# Patient Record
Sex: FEMALE | Race: BLACK OR AFRICAN AMERICAN | NOT HISPANIC OR LATINO | Employment: OTHER | URBAN - METROPOLITAN AREA
[De-identification: names, ages, dates, MRNs, and addresses within clinical notes are randomized per-mention and may not be internally consistent; named-entity substitution may affect disease eponyms.]

---

## 2023-02-23 LAB
ALBUMIN (G/DL) IN SER/PLAS: 4.5 G/DL (ref 3.4–5)
ANION GAP IN SER/PLAS: 16 MMOL/L (ref 10–20)
CALCIUM (MG/DL) IN SER/PLAS: 8.8 MG/DL (ref 8.6–10.3)
CARBON DIOXIDE, TOTAL (MMOL/L) IN SER/PLAS: 28 MMOL/L (ref 21–32)
CHLORIDE (MMOL/L) IN SER/PLAS: 102 MMOL/L (ref 98–107)
CREATININE (MG/DL) IN SER/PLAS: 0.68 MG/DL (ref 0.5–1.05)
GFR FEMALE: >90 ML/MIN/1.73M2
GLUCOSE (MG/DL) IN SER/PLAS: 206 MG/DL (ref 74–99)
PHOSPHATE (MG/DL) IN SER/PLAS: 3.1 MG/DL (ref 2.5–4.9)
POTASSIUM (MMOL/L) IN SER/PLAS: 3.8 MMOL/L (ref 3.5–5.3)
SODIUM (MMOL/L) IN SER/PLAS: 142 MMOL/L (ref 136–145)
UREA NITROGEN (MG/DL) IN SER/PLAS: 10 MG/DL (ref 6–23)

## 2023-03-23 ENCOUNTER — HOSPITAL ENCOUNTER (OUTPATIENT)
Dept: DATA CONVERSION | Facility: HOSPITAL | Age: 68
End: 2023-03-23
Attending: INTERNAL MEDICINE | Admitting: INTERNAL MEDICINE
Payer: COMMERCIAL

## 2023-03-23 DIAGNOSIS — F17.200 NICOTINE DEPENDENCE, UNSPECIFIED, UNCOMPLICATED: ICD-10-CM

## 2023-03-23 DIAGNOSIS — Z88.0 ALLERGY STATUS TO PENICILLIN: ICD-10-CM

## 2023-03-23 DIAGNOSIS — Z98.51 TUBAL LIGATION STATUS: ICD-10-CM

## 2023-03-23 DIAGNOSIS — F31.9 BIPOLAR DISORDER, UNSPECIFIED (MULTI): ICD-10-CM

## 2023-03-23 DIAGNOSIS — E66.01 MORBID (SEVERE) OBESITY DUE TO EXCESS CALORIES (MULTI): ICD-10-CM

## 2023-03-23 DIAGNOSIS — Z88.2 ALLERGY STATUS TO SULFONAMIDES: ICD-10-CM

## 2023-03-23 DIAGNOSIS — E11.9 TYPE 2 DIABETES MELLITUS WITHOUT COMPLICATIONS (MULTI): ICD-10-CM

## 2023-03-23 DIAGNOSIS — Z79.01 LONG TERM (CURRENT) USE OF ANTICOAGULANTS: ICD-10-CM

## 2023-03-23 DIAGNOSIS — D12.8 BENIGN NEOPLASM OF RECTUM: ICD-10-CM

## 2023-03-23 DIAGNOSIS — Z86.718 PERSONAL HISTORY OF OTHER VENOUS THROMBOSIS AND EMBOLISM: ICD-10-CM

## 2023-03-23 DIAGNOSIS — K57.30 DIVERTICULOSIS OF LARGE INTESTINE WITHOUT PERFORATION OR ABSCESS WITHOUT BLEEDING: ICD-10-CM

## 2023-03-23 DIAGNOSIS — D12.6 BENIGN NEOPLASM OF COLON, UNSPECIFIED: ICD-10-CM

## 2023-03-23 DIAGNOSIS — F17.210 NICOTINE DEPENDENCE, CIGARETTES, UNCOMPLICATED: ICD-10-CM

## 2023-03-23 DIAGNOSIS — Z79.4 LONG TERM (CURRENT) USE OF INSULIN (MULTI): ICD-10-CM

## 2023-03-23 DIAGNOSIS — G47.33 OBSTRUCTIVE SLEEP APNEA (ADULT) (PEDIATRIC): ICD-10-CM

## 2023-03-23 DIAGNOSIS — D64.9 ANEMIA, UNSPECIFIED: ICD-10-CM

## 2023-03-23 DIAGNOSIS — D12.2 BENIGN NEOPLASM OF ASCENDING COLON: ICD-10-CM

## 2023-03-23 DIAGNOSIS — J44.9 CHRONIC OBSTRUCTIVE PULMONARY DISEASE, UNSPECIFIED (MULTI): ICD-10-CM

## 2023-03-23 DIAGNOSIS — I10 ESSENTIAL (PRIMARY) HYPERTENSION: ICD-10-CM

## 2023-03-23 DIAGNOSIS — D12.3 BENIGN NEOPLASM OF TRANSVERSE COLON: ICD-10-CM

## 2023-03-23 LAB
POCT GLUCOSE: 124 MG/DL (ref 74–99)
POCT GLUCOSE: 157 MG/DL (ref 74–99)

## 2023-04-03 LAB
COMPLETE PATHOLOGY REPORT: NORMAL
CONVERTED CLINICAL DIAGNOSIS-HISTORY: NORMAL
CONVERTED FINAL DIAGNOSIS: NORMAL
CONVERTED FINAL REPORT PDF LINK TO COPY AND PASTE: NORMAL
CONVERTED GROSS DESCRIPTION: NORMAL

## 2023-09-03 PROBLEM — J44.9 CHRONIC OBSTRUCTIVE LUNG DISEASE (MULTI): Status: ACTIVE | Noted: 2023-09-03

## 2023-09-03 PROBLEM — R20.9 SENSORY DISTURBANCE: Status: ACTIVE | Noted: 2023-09-03

## 2023-09-03 PROBLEM — J45.909 ASTHMA (HHS-HCC): Status: ACTIVE | Noted: 2023-09-03

## 2023-09-03 PROBLEM — G62.9 PERIPHERAL POLYNEUROPATHY: Status: ACTIVE | Noted: 2023-09-03

## 2023-09-03 PROBLEM — G47.00 INSOMNIA: Status: ACTIVE | Noted: 2023-09-03

## 2023-09-03 PROBLEM — D12.6 COLON ADENOMAS: Status: ACTIVE | Noted: 2023-09-03

## 2023-09-03 PROBLEM — M79.602 LEFT ARM PAIN: Status: ACTIVE | Noted: 2023-09-03

## 2023-09-03 PROBLEM — Z79.01 CHRONIC ANTICOAGULATION: Status: ACTIVE | Noted: 2023-09-03

## 2023-09-03 PROBLEM — H92.02 OTALGIA, LEFT: Status: ACTIVE | Noted: 2023-09-03

## 2023-09-03 PROBLEM — H02.59 FLOPPY EYELID SYNDROME: Status: ACTIVE | Noted: 2023-09-03

## 2023-09-03 PROBLEM — G89.29 CHRONIC LOWER BACK PAIN: Status: ACTIVE | Noted: 2023-09-03

## 2023-09-03 PROBLEM — H18.419: Status: ACTIVE | Noted: 2023-09-03

## 2023-09-03 PROBLEM — H52.03 HYPEROPIA OF BOTH EYES: Status: ACTIVE | Noted: 2023-09-03

## 2023-09-03 PROBLEM — E55.9 VITAMIN D DEFICIENCY: Status: ACTIVE | Noted: 2023-09-03

## 2023-09-03 PROBLEM — E04.1 THYROID NODULE: Status: ACTIVE | Noted: 2023-09-03

## 2023-09-03 PROBLEM — H04.123 BILATERAL DRY EYES: Status: ACTIVE | Noted: 2023-09-03

## 2023-09-03 PROBLEM — H02.889 MGD (MEIBOMIAN GLAND DISEASE): Status: ACTIVE | Noted: 2023-09-03

## 2023-09-03 PROBLEM — F41.9 ANXIETY: Status: ACTIVE | Noted: 2023-09-03

## 2023-09-03 PROBLEM — E11.9 TYPE 2 DIABETES MELLITUS (MULTI): Status: ACTIVE | Noted: 2023-09-03

## 2023-09-03 PROBLEM — H52.03 HYPERMETROPIA OF BOTH EYES: Status: ACTIVE | Noted: 2023-09-03

## 2023-09-03 PROBLEM — R25.2 SPASM: Status: ACTIVE | Noted: 2023-09-03

## 2023-09-03 PROBLEM — M47.816 LUMBAR SPONDYLOSIS: Status: ACTIVE | Noted: 2023-09-03

## 2023-09-03 PROBLEM — R19.5 CHANGE IN STOOL CALIBER: Status: ACTIVE | Noted: 2023-09-03

## 2023-09-03 PROBLEM — F17.200 NICOTINE ADDICTION: Status: ACTIVE | Noted: 2023-09-03

## 2023-09-03 PROBLEM — M54.50 CHRONIC LOWER BACK PAIN: Status: ACTIVE | Noted: 2023-09-03

## 2023-09-03 PROBLEM — K62.89 RECTAL PAIN: Status: ACTIVE | Noted: 2023-09-03

## 2023-09-03 PROBLEM — H93.8X2 SENSATION OF PLUGGED EAR ON LEFT SIDE: Status: ACTIVE | Noted: 2023-09-03

## 2023-09-03 PROBLEM — R32 URINARY INCONTINENCE: Status: ACTIVE | Noted: 2023-09-03

## 2023-09-03 PROBLEM — F31.9 BIPOLAR DISORDER (MULTI): Status: ACTIVE | Noted: 2023-09-03

## 2023-09-03 PROBLEM — M47.816 FACET SYNDROME, LUMBAR: Status: ACTIVE | Noted: 2023-09-03

## 2023-09-03 PROBLEM — E66.9 OBESITY: Status: ACTIVE | Noted: 2023-09-03

## 2023-09-03 PROBLEM — G47.30 SLEEP APNEA: Status: ACTIVE | Noted: 2023-09-03

## 2023-09-03 RX ORDER — OXYCODONE AND ACETAMINOPHEN 7.5; 325 MG/1; MG/1
1 TABLET ORAL EVERY 12 HOURS PRN
Status: ON HOLD | COMMUNITY
Start: 2023-02-14 | End: 2023-12-09 | Stop reason: DRUGHIGH

## 2023-09-03 RX ORDER — POLYETHYLENE GLYCOL 3350 17 G/17G
17 POWDER, FOR SOLUTION ORAL DAILY
Status: ON HOLD | COMMUNITY
Start: 2022-11-25 | End: 2023-12-09 | Stop reason: ALTCHOICE

## 2023-09-03 RX ORDER — IBUPROFEN 600 MG/1
600 TABLET ORAL DAILY PRN
COMMUNITY
Start: 2020-03-02

## 2023-09-03 RX ORDER — TRIAMCINOLONE ACETONIDE 5 MG/G
CREAM TOPICAL
COMMUNITY

## 2023-09-03 RX ORDER — OXYCODONE AND ACETAMINOPHEN 5; 325 MG/1; MG/1
TABLET ORAL
COMMUNITY
Start: 2022-12-02

## 2023-09-03 RX ORDER — HYDROCHLOROTHIAZIDE 25 MG/1
1 TABLET ORAL DAILY
COMMUNITY
Start: 2023-01-11

## 2023-09-03 RX ORDER — OXYCODONE AND ACETAMINOPHEN 10; 325 MG/1; MG/1
TABLET ORAL
COMMUNITY

## 2023-09-03 RX ORDER — TIOTROPIUM BROMIDE INHALATION SPRAY 1.56 UG/1
2 SPRAY, METERED RESPIRATORY (INHALATION) DAILY
Status: ON HOLD | COMMUNITY
End: 2023-12-09

## 2023-09-03 RX ORDER — FLUCONAZOLE 150 MG/1
150 TABLET ORAL
Status: ON HOLD | COMMUNITY
Start: 2023-02-13 | End: 2023-12-09 | Stop reason: ALTCHOICE

## 2023-09-03 RX ORDER — AMLODIPINE BESYLATE 10 MG/1
10 TABLET ORAL DAILY
COMMUNITY

## 2023-09-03 RX ORDER — BENZONATATE 200 MG/1
200 CAPSULE ORAL EVERY 8 HOURS PRN
Status: ON HOLD | COMMUNITY
Start: 2023-03-16 | End: 2023-12-09 | Stop reason: WASHOUT

## 2023-09-03 RX ORDER — PEN NEEDLE, DIABETIC 32 GX 1/4"
NEEDLE, DISPOSABLE MISCELLANEOUS DAILY
COMMUNITY
Start: 2022-12-14

## 2023-09-03 RX ORDER — ERGOCALCIFEROL 1.25 MG/1
1 CAPSULE ORAL
Status: ON HOLD | COMMUNITY
Start: 2020-03-04 | End: 2023-12-09 | Stop reason: ALTCHOICE

## 2023-09-03 RX ORDER — LIDOCAINE 50 MG/G
PATCH TOPICAL
COMMUNITY

## 2023-09-03 RX ORDER — PANTOPRAZOLE SODIUM 40 MG/1
1 TABLET, DELAYED RELEASE ORAL DAILY
Status: ON HOLD | COMMUNITY
Start: 2022-11-25 | End: 2023-12-09 | Stop reason: ALTCHOICE

## 2023-09-03 RX ORDER — LEVOFLOXACIN 500 MG/1
1 TABLET, FILM COATED ORAL DAILY
Status: ON HOLD | COMMUNITY
Start: 2022-10-08 | End: 2023-12-09 | Stop reason: ALTCHOICE

## 2023-09-03 RX ORDER — AZITHROMYCIN 500 MG/1
1 TABLET, FILM COATED ORAL DAILY
Status: ON HOLD | COMMUNITY
Start: 2023-03-16 | End: 2023-12-09 | Stop reason: ALTCHOICE

## 2023-09-03 RX ORDER — SERTRALINE HYDROCHLORIDE 50 MG/1
TABLET, FILM COATED ORAL
COMMUNITY
Start: 2017-02-06

## 2023-09-03 RX ORDER — TOPIRAMATE 50 MG/1
1 TABLET, FILM COATED ORAL DAILY
Status: ON HOLD | COMMUNITY
Start: 2020-03-02 | End: 2023-12-09 | Stop reason: ALTCHOICE

## 2023-09-03 RX ORDER — ACETAMINOPHEN 500 MG
1000 TABLET ORAL EVERY 8 HOURS PRN
Status: ON HOLD | COMMUNITY
Start: 2022-10-07 | End: 2023-12-09 | Stop reason: HOSPADM

## 2023-09-03 RX ORDER — CIPROFLOXACIN HYDROCHLORIDE AND HYDROCORTISONE 2; 10 MG/ML; MG/ML
SUSPENSION AURICULAR (OTIC)
Status: ON HOLD | COMMUNITY
Start: 2021-07-14 | End: 2023-12-09 | Stop reason: ALTCHOICE

## 2023-09-03 RX ORDER — INSULIN DETEMIR 100 [IU]/ML
8 INJECTION, SOLUTION SUBCUTANEOUS NIGHTLY
COMMUNITY
End: 2024-03-04 | Stop reason: ALTCHOICE

## 2023-09-03 RX ORDER — CYCLOBENZAPRINE HCL 10 MG
10 TABLET ORAL EVERY 8 HOURS PRN
Status: ON HOLD | COMMUNITY
End: 2023-12-09 | Stop reason: ALTCHOICE

## 2023-09-03 RX ORDER — ALBUTEROL SULFATE 0.83 MG/ML
1 SOLUTION RESPIRATORY (INHALATION) EVERY 6 HOURS PRN
COMMUNITY

## 2023-09-03 RX ORDER — HYDRALAZINE HYDROCHLORIDE 25 MG/1
25 TABLET, FILM COATED ORAL EVERY 6 HOURS
COMMUNITY
Start: 2017-03-27

## 2023-09-03 RX ORDER — METRONIDAZOLE 500 MG/1
TABLET ORAL
Status: ON HOLD | COMMUNITY
Start: 2022-09-29 | End: 2023-12-09 | Stop reason: ALTCHOICE

## 2023-09-03 RX ORDER — FLUTICASONE PROPIONATE 50 MCG
1 SPRAY, SUSPENSION (ML) NASAL 2 TIMES DAILY
Status: ON HOLD | COMMUNITY
Start: 2020-03-06 | End: 2023-12-09 | Stop reason: ALTCHOICE

## 2023-09-03 RX ORDER — BLOOD-GLUCOSE METER
EACH MISCELLANEOUS 4 TIMES DAILY
COMMUNITY
Start: 2022-12-02

## 2023-09-03 RX ORDER — BLOOD-GLUCOSE CONTROL, NORMAL
EACH MISCELLANEOUS
COMMUNITY

## 2023-09-03 RX ORDER — DIAZEPAM 5 MG/1
5 TABLET ORAL EVERY 8 HOURS PRN
COMMUNITY
Start: 2022-09-12

## 2023-09-03 RX ORDER — ERYTHROMYCIN 5 MG/G
OINTMENT OPHTHALMIC 2 TIMES DAILY
Status: ON HOLD | COMMUNITY
Start: 2022-12-22 | End: 2023-12-09 | Stop reason: ALTCHOICE

## 2023-09-03 RX ORDER — ERGOCALCIFEROL (VITAMIN D2) 50 MCG
1 CAPSULE ORAL DAILY
Status: ON HOLD | COMMUNITY
Start: 2020-08-24 | End: 2023-12-09 | Stop reason: ALTCHOICE

## 2023-09-03 RX ORDER — RIVAROXABAN 2.5 MG/1
1 TABLET, FILM COATED ORAL DAILY
COMMUNITY
Start: 2022-12-26

## 2023-09-03 RX ORDER — INSULIN LISPRO 100 [IU]/ML
INJECTION, SUSPENSION SUBCUTANEOUS
COMMUNITY
Start: 2022-12-22 | End: 2024-03-04 | Stop reason: SDUPTHER

## 2023-09-03 RX ORDER — GABAPENTIN 100 MG/1
CAPSULE ORAL
Status: ON HOLD | COMMUNITY
End: 2023-12-09 | Stop reason: ALTCHOICE

## 2023-09-03 RX ORDER — ONDANSETRON 4 MG/1
TABLET, FILM COATED ORAL
Status: ON HOLD | COMMUNITY
Start: 2022-09-29 | End: 2023-12-09 | Stop reason: ALTCHOICE

## 2023-09-03 RX ORDER — DIPHENHYDRAMINE HYDROCHLORIDE 25 MG/1
CAPSULE ORAL
COMMUNITY
Start: 2023-01-30

## 2023-09-03 RX ORDER — CARBOXYMETHYLCELLULOSE SODIUM 5 MG/ML
1 SOLUTION/ DROPS OPHTHALMIC AS NEEDED
COMMUNITY
Start: 2023-04-24

## 2023-09-03 RX ORDER — ALBUTEROL SULFATE 90 UG/1
AEROSOL, METERED RESPIRATORY (INHALATION) AS NEEDED
COMMUNITY

## 2023-10-10 ENCOUNTER — APPOINTMENT (OUTPATIENT)
Dept: GASTROENTEROLOGY | Facility: HOSPITAL | Age: 68
End: 2023-10-10
Payer: COMMERCIAL

## 2023-10-10 DIAGNOSIS — D12.6 COLON ADENOMAS: Primary | ICD-10-CM

## 2023-10-10 RX ORDER — POLYETHYLENE GLYCOL 3350 17 G/17G
238 POWDER, FOR SOLUTION ORAL ONCE
Qty: 238 G | Refills: 0 | Status: SHIPPED | OUTPATIENT
Start: 2023-10-10 | End: 2023-10-10

## 2023-10-17 ENCOUNTER — OFFICE VISIT (OUTPATIENT)
Dept: ORTHOPEDIC SURGERY | Facility: HOSPITAL | Age: 68
End: 2023-10-17
Payer: COMMERCIAL

## 2023-10-17 VITALS — WEIGHT: 243 LBS | BODY MASS INDEX: 41.48 KG/M2 | HEIGHT: 64 IN

## 2023-10-17 DIAGNOSIS — M19.012 ARTHRITIS OF SHOULDER REGION, LEFT: Primary | ICD-10-CM

## 2023-10-17 PROCEDURE — 1159F MED LIST DOCD IN RCRD: CPT | Performed by: ORTHOPAEDIC SURGERY

## 2023-10-17 PROCEDURE — 99214 OFFICE O/P EST MOD 30 MIN: CPT | Performed by: ORTHOPAEDIC SURGERY

## 2023-10-17 PROCEDURE — 3008F BODY MASS INDEX DOCD: CPT | Performed by: ORTHOPAEDIC SURGERY

## 2023-10-17 PROCEDURE — 1125F AMNT PAIN NOTED PAIN PRSNT: CPT | Performed by: ORTHOPAEDIC SURGERY

## 2023-10-17 PROCEDURE — 99204 OFFICE O/P NEW MOD 45 MIN: CPT | Performed by: ORTHOPAEDIC SURGERY

## 2023-10-17 ASSESSMENT — PAIN SCALES - GENERAL: PAINLEVEL_OUTOF10: 8

## 2023-10-17 ASSESSMENT — PAIN - FUNCTIONAL ASSESSMENT: PAIN_FUNCTIONAL_ASSESSMENT: 0-10
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
## 2023-10-26 DIAGNOSIS — D12.6 COLON ADENOMAS: Primary | ICD-10-CM

## 2023-10-26 RX ORDER — POLYETHYLENE GLYCOL-3350 AND ELECTROLYTES WITH FLAVOR PACK 240; 5.84; 2.98; 6.72; 22.72 G/278.26G; G/278.26G; G/278.26G; G/278.26G; G/278.26G
4000 POWDER, FOR SOLUTION ORAL ONCE
Qty: 4000 ML | Refills: 0 | Status: SHIPPED | OUTPATIENT
Start: 2023-10-26 | End: 2023-10-26

## 2023-11-06 ENCOUNTER — APPOINTMENT (OUTPATIENT)
Dept: ORTHOPEDIC SURGERY | Facility: CLINIC | Age: 68
End: 2023-11-06
Payer: COMMERCIAL

## 2023-11-06 ENCOUNTER — OFFICE VISIT (OUTPATIENT)
Dept: ORTHOPEDIC SURGERY | Facility: CLINIC | Age: 68
End: 2023-11-06
Payer: COMMERCIAL

## 2023-11-06 VITALS — BODY MASS INDEX: 39.52 KG/M2 | HEIGHT: 64 IN | WEIGHT: 231.48 LBS

## 2023-11-06 DIAGNOSIS — M25.512 ACUTE PAIN OF LEFT SHOULDER: Primary | ICD-10-CM

## 2023-11-06 PROCEDURE — 99203 OFFICE O/P NEW LOW 30 MIN: CPT | Performed by: ORTHOPAEDIC SURGERY

## 2023-11-06 PROCEDURE — 3008F BODY MASS INDEX DOCD: CPT | Performed by: ORTHOPAEDIC SURGERY

## 2023-11-06 PROCEDURE — 1125F AMNT PAIN NOTED PAIN PRSNT: CPT | Performed by: ORTHOPAEDIC SURGERY

## 2023-11-06 PROCEDURE — 3074F SYST BP LT 130 MM HG: CPT | Performed by: ORTHOPAEDIC SURGERY

## 2023-11-06 PROCEDURE — 1159F MED LIST DOCD IN RCRD: CPT | Performed by: ORTHOPAEDIC SURGERY

## 2023-11-06 PROCEDURE — 1160F RVW MEDS BY RX/DR IN RCRD: CPT | Performed by: ORTHOPAEDIC SURGERY

## 2023-11-06 PROCEDURE — 3078F DIAST BP <80 MM HG: CPT | Performed by: ORTHOPAEDIC SURGERY

## 2023-11-06 ASSESSMENT — PAIN SCALES - GENERAL: PAINLEVEL_OUTOF10: 5 - MODERATE PAIN

## 2023-11-06 ASSESSMENT — PAIN - FUNCTIONAL ASSESSMENT
PAIN_FUNCTIONAL_ASSESSMENT: NO/DENIES PAIN
PAIN_FUNCTIONAL_ASSESSMENT: 0-10

## 2023-11-08 NOTE — PROGRESS NOTES
Chief complaint is longstanding left shoulder pain suspect she had a rotator cuff operation in the distant past has told she has a rotator cuff tear no recent studies  She has pain at rest pain with activity she has been through physical therapy and injections.  She has pain at rest that affects her daily activity  She is on disability  Right-hand-dominant  Past medical,family and social histories have been reviewed and are up to date.  All other body systems have been reviewed and are negative for complaint.  Constitutional: Well-developed well-nourished   Eyes: Sclerae anicteric, pupils equal and round  HENT: Normocephalic atraumatic  Cardiovascular: Pulses full, regular rate and rhythm  Respiratory: Breathing not labored, no wheezing  Integumentary: Skin intact, no lesions or rashes  Neurological: Sensation intact, no gross strength deficits, reflexes equal  Psychiatric: Alert oriented and appropriate  Hematologic/lymphatic: No lymphadenopathy  Left shoulder: There is no mass or swelling she has limited mobility in all planes because of pain seems to have a good deal of weakness  There is crepitus  X-rays show some early degenerative changes may be a narrowed subacromial interval  Given her long history of treatment persistent pain failure to improve recommend further imaging with MRI to assess integrity of cuff surgical options may include reverse total shoulder arthroplasty  All questions answered

## 2023-11-15 ENCOUNTER — APPOINTMENT (OUTPATIENT)
Dept: INTEGRATIVE MEDICINE | Facility: CLINIC | Age: 68
End: 2023-11-15
Payer: COMMERCIAL

## 2023-11-29 ENCOUNTER — APPOINTMENT (OUTPATIENT)
Dept: GASTROENTEROLOGY | Facility: HOSPITAL | Age: 68
End: 2023-11-29
Payer: COMMERCIAL

## 2023-12-04 ENCOUNTER — APPOINTMENT (OUTPATIENT)
Dept: ORTHOPEDIC SURGERY | Facility: CLINIC | Age: 68
End: 2023-12-04
Payer: COMMERCIAL

## 2023-12-06 ENCOUNTER — ANCILLARY PROCEDURE (OUTPATIENT)
Dept: RADIOLOGY | Facility: CLINIC | Age: 68
End: 2023-12-06
Payer: COMMERCIAL

## 2023-12-06 DIAGNOSIS — M25.512 ACUTE PAIN OF LEFT SHOULDER: ICD-10-CM

## 2023-12-06 PROCEDURE — 73221 MRI JOINT UPR EXTREM W/O DYE: CPT | Mod: LEFT SIDE | Performed by: STUDENT IN AN ORGANIZED HEALTH CARE EDUCATION/TRAINING PROGRAM

## 2023-12-06 PROCEDURE — 73221 MRI JOINT UPR EXTREM W/O DYE: CPT | Mod: LT

## 2023-12-07 ENCOUNTER — OFFICE VISIT (OUTPATIENT)
Dept: ORTHOPEDIC SURGERY | Facility: CLINIC | Age: 68
End: 2023-12-07
Payer: COMMERCIAL

## 2023-12-07 VITALS — HEIGHT: 64 IN | BODY MASS INDEX: 39.27 KG/M2 | WEIGHT: 230 LBS

## 2023-12-07 DIAGNOSIS — S46.012D TRAUMATIC COMPLETE TEAR OF LEFT ROTATOR CUFF, SUBSEQUENT ENCOUNTER: ICD-10-CM

## 2023-12-07 DIAGNOSIS — M19.012 ARTHRITIS OF SHOULDER REGION, LEFT: Primary | ICD-10-CM

## 2023-12-07 PROCEDURE — 3008F BODY MASS INDEX DOCD: CPT | Performed by: ORTHOPAEDIC SURGERY

## 2023-12-07 PROCEDURE — 1160F RVW MEDS BY RX/DR IN RCRD: CPT | Performed by: ORTHOPAEDIC SURGERY

## 2023-12-07 PROCEDURE — 20610 DRAIN/INJ JOINT/BURSA W/O US: CPT | Performed by: ORTHOPAEDIC SURGERY

## 2023-12-07 PROCEDURE — 1159F MED LIST DOCD IN RCRD: CPT | Performed by: ORTHOPAEDIC SURGERY

## 2023-12-07 PROCEDURE — 1125F AMNT PAIN NOTED PAIN PRSNT: CPT | Performed by: ORTHOPAEDIC SURGERY

## 2023-12-07 PROCEDURE — 99213 OFFICE O/P EST LOW 20 MIN: CPT | Performed by: ORTHOPAEDIC SURGERY

## 2023-12-07 RX ORDER — LIDOCAINE HYDROCHLORIDE 20 MG/ML
2 INJECTION, SOLUTION INFILTRATION; PERINEURAL
Status: COMPLETED | OUTPATIENT
Start: 2023-12-07 | End: 2023-12-07

## 2023-12-07 RX ORDER — METHYLPREDNISOLONE ACETATE 40 MG/ML
40 INJECTION, SUSPENSION INTRA-ARTICULAR; INTRALESIONAL; INTRAMUSCULAR; SOFT TISSUE
Status: COMPLETED | OUTPATIENT
Start: 2023-12-07 | End: 2023-12-07

## 2023-12-07 RX ADMIN — LIDOCAINE HYDROCHLORIDE 2 ML: 20 INJECTION, SOLUTION INFILTRATION; PERINEURAL at 15:36

## 2023-12-07 RX ADMIN — METHYLPREDNISOLONE ACETATE 40 MG: 40 INJECTION, SUSPENSION INTRA-ARTICULAR; INTRALESIONAL; INTRAMUSCULAR; SOFT TISSUE at 15:36

## 2023-12-07 ASSESSMENT — PAIN SCALES - GENERAL: PAINLEVEL_OUTOF10: 7

## 2023-12-07 ASSESSMENT — PAIN - FUNCTIONAL ASSESSMENT: PAIN_FUNCTIONAL_ASSESSMENT: 0-10

## 2023-12-07 ASSESSMENT — PAIN DESCRIPTION - DESCRIPTORS: DESCRIPTORS: SHARP;SHOOTING;ACHING

## 2023-12-07 NOTE — PROGRESS NOTES
Follow-up left shoulder pain recent MRI shows massive cuff tear irreparable  She has pain that is severe hurts her at nighttime and with activity is progressed  Past medical,family and social histories have been reviewed and are up to date.  All other body systems have been reviewed and are negative for complaint.  Constitutional: Well-developed well-nourished   Eyes: Sclerae anicteric, pupils equal and round  HENT: Normocephalic atraumatic  Cardiovascular: Pulses full, regular rate and rhythm  Respiratory: Breathing not labored, no wheezing  Integumentary: Skin intact, no lesions or rashes  Neurological: Sensation intact, no gross strength deficits, reflexes equal  Psychiatric: Alert oriented and appropriate  Hematologic/lymphatic: No lymphadenopathy  Left shoulder: Crepitant mobility which is mildly restricted painful impingement pain  MRI and x-rays were personally reviewed and findings are as described in report  Assessment rotator cuff arthropathy massive cuff tear  Discussed treatment options she elected for shoulder injection today we discussed shoulder replacement surgery  L Inj/Asp: L subacromial bursa on 12/7/2023 3:36 PM  Indications: pain  Details: 21 G needle, lateral approach  Medications: 40 mg methylPREDNISolone acetate 40 mg/mL; 2 mL lidocaine 20 mg/mL (2 %)

## 2023-12-09 ENCOUNTER — HOSPITAL ENCOUNTER (OUTPATIENT)
Facility: HOSPITAL | Age: 68
Setting detail: OBSERVATION
Discharge: HOME | End: 2023-12-11
Attending: GENERAL PRACTICE | Admitting: INTERNAL MEDICINE
Payer: COMMERCIAL

## 2023-12-09 ENCOUNTER — APPOINTMENT (OUTPATIENT)
Dept: RADIOLOGY | Facility: HOSPITAL | Age: 68
End: 2023-12-09
Payer: COMMERCIAL

## 2023-12-09 DIAGNOSIS — A41.9 SEPSIS SECONDARY TO UTI (MULTI): Primary | ICD-10-CM

## 2023-12-09 DIAGNOSIS — N39.0 SEPSIS SECONDARY TO UTI (MULTI): Primary | ICD-10-CM

## 2023-12-09 DIAGNOSIS — N30.00 ACUTE CYSTITIS WITHOUT HEMATURIA: ICD-10-CM

## 2023-12-09 LAB
ALBUMIN SERPL BCP-MCNC: 4.3 G/DL (ref 3.4–5)
ALP SERPL-CCNC: 87 U/L (ref 33–136)
ALT SERPL W P-5'-P-CCNC: 8 U/L (ref 7–45)
ANION GAP SERPL CALC-SCNC: 14 MMOL/L (ref 10–20)
APPEARANCE UR: ABNORMAL
AST SERPL W P-5'-P-CCNC: 16 U/L (ref 9–39)
BACTERIA #/AREA URNS AUTO: ABNORMAL /HPF
BILIRUB SERPL-MCNC: 0.6 MG/DL (ref 0–1.2)
BILIRUB UR STRIP.AUTO-MCNC: NEGATIVE MG/DL
BUN SERPL-MCNC: 14 MG/DL (ref 6–23)
CALCIUM SERPL-MCNC: 9.5 MG/DL (ref 8.6–10.3)
CHLORIDE SERPL-SCNC: 100 MMOL/L (ref 98–107)
CO2 SERPL-SCNC: 28 MMOL/L (ref 21–32)
COLOR UR: YELLOW
CREAT SERPL-MCNC: 0.68 MG/DL (ref 0.5–1.05)
ERYTHROCYTE [DISTWIDTH] IN BLOOD BY AUTOMATED COUNT: 13.7 % (ref 11.5–14.5)
FLUAV RNA RESP QL NAA+PROBE: NOT DETECTED
FLUBV RNA RESP QL NAA+PROBE: NOT DETECTED
GFR SERPL CREATININE-BSD FRML MDRD: >90 ML/MIN/1.73M*2
GLUCOSE SERPL-MCNC: 123 MG/DL (ref 74–99)
GLUCOSE UR STRIP.AUTO-MCNC: NEGATIVE MG/DL
HCT VFR BLD AUTO: 41.6 % (ref 36–46)
HGB BLD-MCNC: 13.6 G/DL (ref 12–16)
HYALINE CASTS #/AREA URNS AUTO: ABNORMAL /LPF
KETONES UR STRIP.AUTO-MCNC: NEGATIVE MG/DL
LEUKOCYTE ESTERASE UR QL STRIP.AUTO: ABNORMAL
MCH RBC QN AUTO: 30.2 PG (ref 26–34)
MCHC RBC AUTO-ENTMCNC: 32.7 G/DL (ref 32–36)
MCV RBC AUTO: 92 FL (ref 80–100)
MUCOUS THREADS #/AREA URNS AUTO: ABNORMAL /LPF
NITRITE UR QL STRIP.AUTO: NEGATIVE
NRBC BLD-RTO: 0 /100 WBCS (ref 0–0)
PH UR STRIP.AUTO: 5 [PH]
PLATELET # BLD AUTO: 296 X10*3/UL (ref 150–450)
POTASSIUM SERPL-SCNC: 4.2 MMOL/L (ref 3.5–5.3)
PROT SERPL-MCNC: 7.7 G/DL (ref 6.4–8.2)
PROT UR STRIP.AUTO-MCNC: NEGATIVE MG/DL
RBC # BLD AUTO: 4.51 X10*6/UL (ref 4–5.2)
RBC # UR STRIP.AUTO: NEGATIVE /UL
RBC #/AREA URNS AUTO: ABNORMAL /HPF
RSV RNA RESP QL NAA+PROBE: NOT DETECTED
SARS-COV-2 RNA RESP QL NAA+PROBE: NOT DETECTED
SODIUM SERPL-SCNC: 138 MMOL/L (ref 136–145)
SP GR UR STRIP.AUTO: 1.01
SQUAMOUS #/AREA URNS AUTO: ABNORMAL /HPF
UROBILINOGEN UR STRIP.AUTO-MCNC: <2 MG/DL
WBC # BLD AUTO: 14.4 X10*3/UL (ref 4.4–11.3)
WBC #/AREA URNS AUTO: ABNORMAL /HPF

## 2023-12-09 PROCEDURE — 81001 URINALYSIS AUTO W/SCOPE: CPT | Performed by: GENERAL PRACTICE

## 2023-12-09 PROCEDURE — 99285 EMERGENCY DEPT VISIT HI MDM: CPT | Mod: 25 | Performed by: GENERAL PRACTICE

## 2023-12-09 PROCEDURE — 36415 COLL VENOUS BLD VENIPUNCTURE: CPT | Performed by: GENERAL PRACTICE

## 2023-12-09 PROCEDURE — 96365 THER/PROPH/DIAG IV INF INIT: CPT

## 2023-12-09 PROCEDURE — 71045 X-RAY EXAM CHEST 1 VIEW: CPT | Mod: FY

## 2023-12-09 PROCEDURE — 2500000004 HC RX 250 GENERAL PHARMACY W/ HCPCS (ALT 636 FOR OP/ED): Performed by: GENERAL PRACTICE

## 2023-12-09 PROCEDURE — 80053 COMPREHEN METABOLIC PANEL: CPT | Performed by: GENERAL PRACTICE

## 2023-12-09 PROCEDURE — G0378 HOSPITAL OBSERVATION PER HR: HCPCS

## 2023-12-09 PROCEDURE — 87086 URINE CULTURE/COLONY COUNT: CPT | Mod: AHULAB | Performed by: GENERAL PRACTICE

## 2023-12-09 PROCEDURE — 71045 X-RAY EXAM CHEST 1 VIEW: CPT | Performed by: RADIOLOGY

## 2023-12-09 PROCEDURE — 85027 COMPLETE CBC AUTOMATED: CPT | Performed by: GENERAL PRACTICE

## 2023-12-09 PROCEDURE — 94760 N-INVAS EAR/PLS OXIMETRY 1: CPT

## 2023-12-09 PROCEDURE — 87637 SARSCOV2&INF A&B&RSV AMP PRB: CPT | Performed by: GENERAL PRACTICE

## 2023-12-09 RX ORDER — ACETAMINOPHEN 325 MG/1
975 TABLET ORAL ONCE
Status: DISCONTINUED | OUTPATIENT
Start: 2023-12-09 | End: 2023-12-11 | Stop reason: HOSPADM

## 2023-12-09 RX ORDER — ACETAMINOPHEN 325 MG/1
975 TABLET ORAL ONCE
Status: COMPLETED | OUTPATIENT
Start: 2023-12-09 | End: 2023-12-09

## 2023-12-09 RX ORDER — CIPROFLOXACIN 2 MG/ML
400 INJECTION, SOLUTION INTRAVENOUS ONCE
Status: COMPLETED | OUTPATIENT
Start: 2023-12-09 | End: 2023-12-09

## 2023-12-09 RX ADMIN — CIPROFLOXACIN 400 MG: 400 INJECTION, SOLUTION INTRAVENOUS at 20:19

## 2023-12-09 RX ADMIN — SODIUM CHLORIDE 1000 ML: 9 INJECTION, SOLUTION INTRAVENOUS at 18:04

## 2023-12-09 RX ADMIN — ACETAMINOPHEN 975 MG: 325 TABLET ORAL at 18:56

## 2023-12-09 ASSESSMENT — COLUMBIA-SUICIDE SEVERITY RATING SCALE - C-SSRS
6. HAVE YOU EVER DONE ANYTHING, STARTED TO DO ANYTHING, OR PREPARED TO DO ANYTHING TO END YOUR LIFE?: NO
1. IN THE PAST MONTH, HAVE YOU WISHED YOU WERE DEAD OR WISHED YOU COULD GO TO SLEEP AND NOT WAKE UP?: NO
2. HAVE YOU ACTUALLY HAD ANY THOUGHTS OF KILLING YOURSELF?: NO

## 2023-12-09 ASSESSMENT — PAIN - FUNCTIONAL ASSESSMENT: PAIN_FUNCTIONAL_ASSESSMENT: 0-10

## 2023-12-09 ASSESSMENT — PAIN SCALES - GENERAL: PAINLEVEL_OUTOF10: 0 - NO PAIN

## 2023-12-10 LAB
ANION GAP SERPL CALC-SCNC: 11 MMOL/L (ref 10–20)
BUN SERPL-MCNC: 14 MG/DL (ref 6–23)
CALCIUM SERPL-MCNC: 8.4 MG/DL (ref 8.6–10.3)
CHLORIDE SERPL-SCNC: 101 MMOL/L (ref 98–107)
CO2 SERPL-SCNC: 26 MMOL/L (ref 21–32)
CREAT SERPL-MCNC: 0.65 MG/DL (ref 0.5–1.05)
ERYTHROCYTE [DISTWIDTH] IN BLOOD BY AUTOMATED COUNT: 13.8 % (ref 11.5–14.5)
GFR SERPL CREATININE-BSD FRML MDRD: >90 ML/MIN/1.73M*2
GLUCOSE BLD MANUAL STRIP-MCNC: 171 MG/DL (ref 74–99)
GLUCOSE BLD MANUAL STRIP-MCNC: 199 MG/DL (ref 74–99)
GLUCOSE BLD MANUAL STRIP-MCNC: 213 MG/DL (ref 74–99)
GLUCOSE BLD MANUAL STRIP-MCNC: 215 MG/DL (ref 74–99)
GLUCOSE SERPL-MCNC: 186 MG/DL (ref 74–99)
HCT VFR BLD AUTO: 34.4 % (ref 36–46)
HGB BLD-MCNC: 11.6 G/DL (ref 12–16)
LACTATE SERPL-SCNC: 0.7 MMOL/L (ref 0.4–2)
MCH RBC QN AUTO: 30.9 PG (ref 26–34)
MCHC RBC AUTO-ENTMCNC: 33.7 G/DL (ref 32–36)
MCV RBC AUTO: 92 FL (ref 80–100)
NRBC BLD-RTO: 0 /100 WBCS (ref 0–0)
PLATELET # BLD AUTO: 224 X10*3/UL (ref 150–450)
POTASSIUM SERPL-SCNC: 3.7 MMOL/L (ref 3.5–5.3)
RBC # BLD AUTO: 3.76 X10*6/UL (ref 4–5.2)
SODIUM SERPL-SCNC: 134 MMOL/L (ref 136–145)
WBC # BLD AUTO: 14.6 X10*3/UL (ref 4.4–11.3)

## 2023-12-10 PROCEDURE — 2500000004 HC RX 250 GENERAL PHARMACY W/ HCPCS (ALT 636 FOR OP/ED): Performed by: NURSE PRACTITIONER

## 2023-12-10 PROCEDURE — 2500000002 HC RX 250 W HCPCS SELF ADMINISTERED DRUGS (ALT 637 FOR MEDICARE OP, ALT 636 FOR OP/ED): Performed by: HOSPITALIST

## 2023-12-10 PROCEDURE — 99222 1ST HOSP IP/OBS MODERATE 55: CPT | Performed by: NURSE PRACTITIONER

## 2023-12-10 PROCEDURE — 83605 ASSAY OF LACTIC ACID: CPT | Performed by: NURSE PRACTITIONER

## 2023-12-10 PROCEDURE — 85027 COMPLETE CBC AUTOMATED: CPT | Performed by: HOSPITALIST

## 2023-12-10 PROCEDURE — 94760 N-INVAS EAR/PLS OXIMETRY 1: CPT

## 2023-12-10 PROCEDURE — 96366 THER/PROPH/DIAG IV INF ADDON: CPT

## 2023-12-10 PROCEDURE — 2500000004 HC RX 250 GENERAL PHARMACY W/ HCPCS (ALT 636 FOR OP/ED): Performed by: HOSPITALIST

## 2023-12-10 PROCEDURE — 36415 COLL VENOUS BLD VENIPUNCTURE: CPT | Performed by: NURSE PRACTITIONER

## 2023-12-10 PROCEDURE — G0378 HOSPITAL OBSERVATION PER HR: HCPCS

## 2023-12-10 PROCEDURE — 2500000001 HC RX 250 WO HCPCS SELF ADMINISTERED DRUGS (ALT 637 FOR MEDICARE OP): Performed by: HOSPITALIST

## 2023-12-10 PROCEDURE — 82947 ASSAY GLUCOSE BLOOD QUANT: CPT | Mod: 91

## 2023-12-10 PROCEDURE — 80048 BASIC METABOLIC PNL TOTAL CA: CPT | Performed by: HOSPITALIST

## 2023-12-10 PROCEDURE — 36415 COLL VENOUS BLD VENIPUNCTURE: CPT | Performed by: HOSPITALIST

## 2023-12-10 PROCEDURE — 2500000001 HC RX 250 WO HCPCS SELF ADMINISTERED DRUGS (ALT 637 FOR MEDICARE OP): Performed by: PHYSICIAN ASSISTANT

## 2023-12-10 RX ORDER — CIPROFLOXACIN 2 MG/ML
400 INJECTION, SOLUTION INTRAVENOUS EVERY 12 HOURS
Status: DISCONTINUED | OUTPATIENT
Start: 2023-12-10 | End: 2023-12-11 | Stop reason: HOSPADM

## 2023-12-10 RX ORDER — SODIUM CHLORIDE 9 MG/ML
100 INJECTION, SOLUTION INTRAVENOUS CONTINUOUS
Status: DISCONTINUED | OUTPATIENT
Start: 2023-12-10 | End: 2023-12-11 | Stop reason: HOSPADM

## 2023-12-10 RX ORDER — DIPHENHYDRAMINE HCL 25 MG
25 CAPSULE ORAL EVERY 6 HOURS PRN
Status: DISCONTINUED | OUTPATIENT
Start: 2023-12-10 | End: 2023-12-11 | Stop reason: HOSPADM

## 2023-12-10 RX ORDER — SODIUM CHLORIDE 9 MG/ML
75 INJECTION, SOLUTION INTRAVENOUS CONTINUOUS
Status: DISCONTINUED | OUTPATIENT
Start: 2023-12-10 | End: 2023-12-11 | Stop reason: HOSPADM

## 2023-12-10 RX ORDER — AMLODIPINE BESYLATE 10 MG/1
10 TABLET ORAL DAILY
Status: DISCONTINUED | OUTPATIENT
Start: 2023-12-10 | End: 2023-12-11 | Stop reason: HOSPADM

## 2023-12-10 RX ORDER — ALBUTEROL SULFATE 90 UG/1
1 AEROSOL, METERED RESPIRATORY (INHALATION) EVERY 4 HOURS PRN
Status: DISCONTINUED | OUTPATIENT
Start: 2023-12-10 | End: 2023-12-10

## 2023-12-10 RX ORDER — ACETAMINOPHEN 325 MG/1
650 TABLET ORAL EVERY 4 HOURS PRN
Status: DISCONTINUED | OUTPATIENT
Start: 2023-12-10 | End: 2023-12-11 | Stop reason: HOSPADM

## 2023-12-10 RX ORDER — ENOXAPARIN SODIUM 100 MG/ML
40 INJECTION SUBCUTANEOUS EVERY 24 HOURS
Status: DISCONTINUED | OUTPATIENT
Start: 2023-12-10 | End: 2023-12-11 | Stop reason: HOSPADM

## 2023-12-10 RX ORDER — ALBUTEROL SULFATE 0.83 MG/ML
2.5 SOLUTION RESPIRATORY (INHALATION) EVERY 4 HOURS PRN
Status: DISCONTINUED | OUTPATIENT
Start: 2023-12-10 | End: 2023-12-11 | Stop reason: HOSPADM

## 2023-12-10 RX ORDER — ONDANSETRON 4 MG/1
4 TABLET, ORALLY DISINTEGRATING ORAL EVERY 8 HOURS PRN
Status: DISCONTINUED | OUTPATIENT
Start: 2023-12-10 | End: 2023-12-11 | Stop reason: HOSPADM

## 2023-12-10 RX ORDER — INSULIN LISPRO 100 [IU]/ML
0-10 INJECTION, SOLUTION INTRAVENOUS; SUBCUTANEOUS
Status: DISCONTINUED | OUTPATIENT
Start: 2023-12-10 | End: 2023-12-11 | Stop reason: HOSPADM

## 2023-12-10 RX ORDER — INSULIN GLARGINE 100 [IU]/ML
8 INJECTION, SOLUTION SUBCUTANEOUS NIGHTLY
Status: DISCONTINUED | OUTPATIENT
Start: 2023-12-10 | End: 2023-12-11 | Stop reason: HOSPADM

## 2023-12-10 RX ORDER — DEXTROSE 50 % IN WATER (D50W) INTRAVENOUS SYRINGE
25
Status: DISCONTINUED | OUTPATIENT
Start: 2023-12-10 | End: 2023-12-11 | Stop reason: HOSPADM

## 2023-12-10 RX ORDER — CIPROFLOXACIN 2 MG/ML
400 INJECTION, SOLUTION INTRAVENOUS EVERY 12 HOURS
Status: DISCONTINUED | OUTPATIENT
Start: 2023-12-10 | End: 2023-12-10 | Stop reason: SDUPTHER

## 2023-12-10 RX ORDER — DEXTROSE MONOHYDRATE 100 MG/ML
0.3 INJECTION, SOLUTION INTRAVENOUS ONCE AS NEEDED
Status: DISCONTINUED | OUTPATIENT
Start: 2023-12-10 | End: 2023-12-11 | Stop reason: HOSPADM

## 2023-12-10 RX ORDER — ONDANSETRON HYDROCHLORIDE 2 MG/ML
4 INJECTION, SOLUTION INTRAVENOUS EVERY 8 HOURS PRN
Status: DISCONTINUED | OUTPATIENT
Start: 2023-12-10 | End: 2023-12-11 | Stop reason: HOSPADM

## 2023-12-10 RX ORDER — SENNOSIDES 8.6 MG/1
2 TABLET ORAL 2 TIMES DAILY
Status: DISCONTINUED | OUTPATIENT
Start: 2023-12-10 | End: 2023-12-11 | Stop reason: HOSPADM

## 2023-12-10 RX ADMIN — SODIUM CHLORIDE 100 ML/HR: 9 INJECTION, SOLUTION INTRAVENOUS at 01:04

## 2023-12-10 RX ADMIN — DIPHENHYDRAMINE HYDROCHLORIDE 25 MG: 25 CAPSULE ORAL at 21:26

## 2023-12-10 RX ADMIN — INSULIN LISPRO 2 UNITS: 100 INJECTION, SOLUTION INTRAVENOUS; SUBCUTANEOUS at 14:57

## 2023-12-10 RX ADMIN — SODIUM CHLORIDE 75 ML/HR: 9 INJECTION, SOLUTION INTRAVENOUS at 15:01

## 2023-12-10 RX ADMIN — CIPROFLOXACIN 400 MG: 400 INJECTION, SOLUTION INTRAVENOUS at 09:17

## 2023-12-10 RX ADMIN — INSULIN LISPRO 4 UNITS: 100 INJECTION, SOLUTION INTRAVENOUS; SUBCUTANEOUS at 16:44

## 2023-12-10 RX ADMIN — SENNOSIDES 17.2 MG: 8.6 TABLET, FILM COATED ORAL at 01:03

## 2023-12-10 RX ADMIN — CIPROFLOXACIN 400 MG: 400 INJECTION, SOLUTION INTRAVENOUS at 21:19

## 2023-12-10 SDOH — ECONOMIC STABILITY: FOOD INSECURITY: WITHIN THE PAST 12 MONTHS, THE FOOD YOU BOUGHT JUST DIDN'T LAST AND YOU DIDN'T HAVE MONEY TO GET MORE.: OFTEN TRUE

## 2023-12-10 SDOH — SOCIAL STABILITY: SOCIAL INSECURITY: DO YOU FEEL UNSAFE GOING BACK TO THE PLACE WHERE YOU ARE LIVING?: NO

## 2023-12-10 SDOH — SOCIAL STABILITY: SOCIAL INSECURITY: WERE YOU ABLE TO COMPLETE ALL THE BEHAVIORAL HEALTH SCREENINGS?: YES

## 2023-12-10 SDOH — ECONOMIC STABILITY: FOOD INSECURITY: WITHIN THE PAST 12 MONTHS, YOU WORRIED THAT YOUR FOOD WOULD RUN OUT BEFORE YOU GOT MONEY TO BUY MORE.: OFTEN TRUE

## 2023-12-10 SDOH — SOCIAL STABILITY: SOCIAL INSECURITY: HAS ANYONE EVER THREATENED TO HURT YOUR FAMILY OR YOUR PETS?: NO

## 2023-12-10 SDOH — SOCIAL STABILITY: SOCIAL INSECURITY: DOES ANYONE TRY TO KEEP YOU FROM HAVING/CONTACTING OTHER FRIENDS OR DOING THINGS OUTSIDE YOUR HOME?: NO

## 2023-12-10 SDOH — SOCIAL STABILITY: SOCIAL INSECURITY: ABUSE: ADULT

## 2023-12-10 SDOH — SOCIAL STABILITY: SOCIAL INSECURITY: ARE YOU OR HAVE YOU BEEN THREATENED OR ABUSED PHYSICALLY, EMOTIONALLY, OR SEXUALLY BY ANYONE?: NO

## 2023-12-10 SDOH — SOCIAL STABILITY: SOCIAL INSECURITY: HAVE YOU HAD THOUGHTS OF HARMING ANYONE ELSE?: NO

## 2023-12-10 SDOH — SOCIAL STABILITY: SOCIAL INSECURITY: ARE THERE ANY APPARENT SIGNS OF INJURIES/BEHAVIORS THAT COULD BE RELATED TO ABUSE/NEGLECT?: NO

## 2023-12-10 SDOH — SOCIAL STABILITY: SOCIAL INSECURITY: DO YOU FEEL ANYONE HAS EXPLOITED OR TAKEN ADVANTAGE OF YOU FINANCIALLY OR OF YOUR PERSONAL PROPERTY?: NO

## 2023-12-10 ASSESSMENT — PAIN - FUNCTIONAL ASSESSMENT
PAIN_FUNCTIONAL_ASSESSMENT: 0-10

## 2023-12-10 ASSESSMENT — COGNITIVE AND FUNCTIONAL STATUS - GENERAL
DAILY ACTIVITIY SCORE: 24
DAILY ACTIVITIY SCORE: 24
PATIENT BASELINE BEDBOUND: NO
MOBILITY SCORE: 24
MOBILITY SCORE: 24

## 2023-12-10 ASSESSMENT — ACTIVITIES OF DAILY LIVING (ADL)
GROOMING: INDEPENDENT
ADEQUATE_TO_COMPLETE_ADL: YES
WALKS IN HOME: INDEPENDENT
TOILETING: INDEPENDENT
BATHING: INDEPENDENT
HEARING - RIGHT EAR: FUNCTIONAL
HEARING - LEFT EAR: FUNCTIONAL
JUDGMENT_ADEQUATE_SAFELY_COMPLETE_DAILY_ACTIVITIES: YES
FEEDING YOURSELF: INDEPENDENT
LACK_OF_TRANSPORTATION: YES
PATIENT'S MEMORY ADEQUATE TO SAFELY COMPLETE DAILY ACTIVITIES?: YES
DRESSING YOURSELF: INDEPENDENT

## 2023-12-10 ASSESSMENT — PATIENT HEALTH QUESTIONNAIRE - PHQ9
1. LITTLE INTEREST OR PLEASURE IN DOING THINGS: NOT AT ALL
SUM OF ALL RESPONSES TO PHQ9 QUESTIONS 1 & 2: 0
2. FEELING DOWN, DEPRESSED OR HOPELESS: NOT AT ALL

## 2023-12-10 ASSESSMENT — ENCOUNTER SYMPTOMS
CHILLS: 1
TREMORS: 1

## 2023-12-10 ASSESSMENT — LIFESTYLE VARIABLES
SKIP TO QUESTIONS 9-10: 1
AUDIT-C TOTAL SCORE: 1
AUDIT-C TOTAL SCORE: 1
HOW OFTEN DO YOU HAVE A DRINK CONTAINING ALCOHOL: MONTHLY OR LESS
HOW OFTEN DO YOU HAVE 6 OR MORE DRINKS ON ONE OCCASION: NEVER
HOW MANY STANDARD DRINKS CONTAINING ALCOHOL DO YOU HAVE ON A TYPICAL DAY: PATIENT DOES NOT DRINK

## 2023-12-10 ASSESSMENT — PAIN SCALES - GENERAL
PAINLEVEL_OUTOF10: 5 - MODERATE PAIN
PAINLEVEL_OUTOF10: 7

## 2023-12-10 NOTE — PROGRESS NOTES
SW requested to see Pt regarding food access. Met with Pt. Pt receives food assistance and Social Security. Pt reports going to food pantries however states the food at the pantries are not healthy, they are high in sodium etc. Discussed food pantries that give out fresh fruits and vegetables. Pt given the number for [employer] to find locations that hand out healthy foods. Pt states in the summer she goes to the library and they have healthy food choices. Discussed Pt utilizing [employer] transportation to take her to food pantries. Pt reports she normally gets ride to the food pantries.

## 2023-12-10 NOTE — CARE PLAN
The patient's goals for the shift include no adverse reactions from antibiotics states multiple allergies    The clinical goals for the shift include obtain antibiotics    Over the shift, the patient continues to make progress toward the following goals.

## 2023-12-10 NOTE — HOSPITAL COURSE
You came to the hospital for chills fever. Found to have a UTI. We started IV antibiotics and kept in the hospital until you were fever free. You are being discharged on oral antibiotics.

## 2023-12-10 NOTE — H&P
History Of Present Illness  Nahomi Saavedra is a 68 y.o. female presenting with chills and rigor.  She reports that the symptoms started yesterday.  She reports that her daughter had just left her house, and within 30 minutes she started experiencing these chills and full body rigors.  She reports that the tremors were so bad that she was unable to dial her phone and had to ask her phone to dial for her.  She denies any urinary symptoms or cough.  She reports that she did check her temperature yesterday and did not have a fever.  However she did develop a fever of 102.7 overnight.     Past Medical History  Past Medical History:   Diagnosis Date    Anxiety disorder, unspecified 03/02/2020    Anxiety    Arcus senilis, unspecified eye 11/29/2016    Senile corneal change    Bipolar disorder, unspecified (CMS/[facility]) 11/29/2016    Bipolar disorder    Chronic obstructive pulmonary disease, unspecified (CMS/[facility]) 11/29/2016    Chronic obstructive lung disease    Cramp and spasm 01/04/2017    Spasm    Insomnia, unspecified 01/04/2017    Insomnia    Low back pain, unspecified 03/02/2020    Chronic lower back pain    Obesity, unspecified 11/29/2016    Obesity    Polyneuropathy, unspecified 01/04/2017    Peripheral polyneuropathy    Spondylosis without myelopathy or radiculopathy, lumbar region 02/28/2017    Lumbar spondylosis    Spondylosis without myelopathy or radiculopathy, lumbar region 01/04/2017    Facet syndrome, lumbar    Type 2 diabetes mellitus without complications (CMS/[facility]) 03/02/2020    Type 2 diabetes mellitus    Unspecified disturbances of skin sensation 02/28/2017    Sensory disturbance    Unspecified urinary incontinence 01/04/2017    Urinary incontinence    Zoster without complications 11/29/2016    Herpes zoster       Surgical History  Past Surgical History:   Procedure Laterality Date    OTHER SURGICAL HISTORY  06/30/2021    Shoulder arthroscopy    US GUIDED THYROID BIOPSY  3/7/2022    US GUIDED THYROID BIOPSY  "3/7/2022        Social History  She reports that she has been smoking cigarettes. She has a 14.00 pack-year smoking history. She has never used smokeless tobacco. She reports current alcohol use. She reports that she does not currently use drugs.    Family History  Family History   Problem Relation Name Age of Onset    Depression Mother      Diabetes Mother      Other (essential hypertension) Mother      Cancer Mother          Allergies  Peg 3350-sod sulf,chlr-pot-mag; Ace inhibitors; Albiglutide; Cromolyn; Hydrocodone-homatropine; Insulin aspart; Insulin nph human isophane; Oxybutynin; Penicillins; Sulfa (sulfonamide antibiotics); and Ziprasidone    Review of Systems   Constitutional:  Positive for chills.   Neurological:  Positive for tremors.   All other systems reviewed and are negative.       Physical Exam  Constitutional:       Appearance: Normal appearance.   Cardiovascular:      Rate and Rhythm: Normal rate and regular rhythm.      Pulses: Normal pulses.      Heart sounds: Normal heart sounds. No murmur heard.     No gallop.   Pulmonary:      Effort: Pulmonary effort is normal. No respiratory distress.      Breath sounds: Normal breath sounds. No wheezing or rhonchi.   Abdominal:      General: Abdomen is flat. There is no distension.      Palpations: Abdomen is soft.      Tenderness: There is no abdominal tenderness. There is no guarding.   Musculoskeletal:         General: Normal range of motion.   Skin:     General: Skin is warm.      Capillary Refill: Capillary refill takes less than 2 seconds.   Neurological:      Mental Status: She is alert and oriented to person, place, and time.   Psychiatric:         Mood and Affect: Mood normal.         Thought Content: Thought content normal.         Judgment: Judgment normal.          Last Recorded Vitals  Blood pressure 157/72, pulse 68, temperature 36.4 °C (97.5 °F), temperature source Temporal, resp. rate 18, height 1.626 m (5' 4.02\"), weight 104 kg (228 lb 6.3 " oz), SpO2 96 %.    Relevant Results  Scheduled medications  acetaminophen, 975 mg, oral, Once  amLODIPine, 10 mg, oral, Daily  ciprofloxacin, 400 mg, intravenous, q12h  enoxaparin, 40 mg, subcutaneous, q24h  insulin glargine, 8 Units, subcutaneous, Nightly  insulin lispro, 0-10 Units, subcutaneous, TID with meals  insulin lispro protamin-lispro, 16 Units, subcutaneous, Daily before evening meal  [START ON 12/11/2023] insulin lispro protamin-lispro, 26 Units, subcutaneous, Daily before breakfast  sennosides, 2 tablet, oral, BID      Continuous medications  sodium chloride 0.9%, 100 mL/hr, Last Rate: 100 mL/hr (12/10/23 0543)      PRN medications  PRN medications: acetaminophen, acetaminophen, albuterol, dextrose 10 % in water (D10W), dextrose, glucagon, ondansetron ODT **OR** ondansetron    Results for orders placed or performed during the hospital encounter of 12/09/23 (from the past 24 hour(s))   CBC   Result Value Ref Range    WBC 14.4 (H) 4.4 - 11.3 x10*3/uL    nRBC 0.0 0.0 - 0.0 /100 WBCs    RBC 4.51 4.00 - 5.20 x10*6/uL    Hemoglobin 13.6 12.0 - 16.0 g/dL    Hematocrit 41.6 36.0 - 46.0 %    MCV 92 80 - 100 fL    MCH 30.2 26.0 - 34.0 pg    MCHC 32.7 32.0 - 36.0 g/dL    RDW 13.7 11.5 - 14.5 %    Platelets 296 150 - 450 x10*3/uL   Comprehensive metabolic panel   Result Value Ref Range    Glucose 123 (H) 74 - 99 mg/dL    Sodium 138 136 - 145 mmol/L    Potassium 4.2 3.5 - 5.3 mmol/L    Chloride 100 98 - 107 mmol/L    Bicarbonate 28 21 - 32 mmol/L    Anion Gap 14 10 - 20 mmol/L    Urea Nitrogen 14 6 - 23 mg/dL    Creatinine 0.68 0.50 - 1.05 mg/dL    eGFR >90 >60 mL/min/1.73m*2    Calcium 9.5 8.6 - 10.3 mg/dL    Albumin 4.3 3.4 - 5.0 g/dL    Alkaline Phosphatase 87 33 - 136 U/L    Total Protein 7.7 6.4 - 8.2 g/dL    AST 16 9 - 39 U/L    Bilirubin, Total 0.6 0.0 - 1.2 mg/dL    ALT 8 7 - 45 U/L   Sars-CoV-2 and Influenza A/B PCR   Result Value Ref Range    Flu A Result Not Detected Not Detected    Flu B Result Not  Detected Not Detected    Coronavirus 2019, PCR Not Detected Not Detected   RSV PCR   Result Value Ref Range    RSV PCR Not Detected Not Detected   Urinalysis with Reflex Microscopic   Result Value Ref Range    Color, Urine Yellow Straw, Yellow    Appearance, Urine Hazy (N) Clear    Specific Gravity, Urine 1.012 1.005 - 1.035    pH, Urine 5.0 5.0, 5.5, 6.0, 6.5, 7.0, 7.5, 8.0    Protein, Urine NEGATIVE NEGATIVE mg/dL    Glucose, Urine NEGATIVE NEGATIVE mg/dL    Blood, Urine NEGATIVE NEGATIVE    Ketones, Urine NEGATIVE NEGATIVE mg/dL    Bilirubin, Urine NEGATIVE NEGATIVE    Urobilinogen, Urine <2.0 <2.0 mg/dL    Nitrite, Urine NEGATIVE NEGATIVE    Leukocyte Esterase, Urine SMALL (1+) (A) NEGATIVE   Microscopic Only, Urine   Result Value Ref Range    WBC, Urine 6-10 (A) 1-5, NONE /HPF    RBC, Urine NONE NONE, 1-2, 3-5 /HPF    Squamous Epithelial Cells, Urine 1-9 (SPARSE) Reference range not established. /HPF    Bacteria, Urine 4+ (A) NONE SEEN /HPF    Mucus, Urine 3+ Reference range not established. /LPF    Hyaline Casts, Urine OCCASIONAL (A) NONE /LPF   CBC   Result Value Ref Range    WBC 14.6 (H) 4.4 - 11.3 x10*3/uL    nRBC 0.0 0.0 - 0.0 /100 WBCs    RBC 3.76 (L) 4.00 - 5.20 x10*6/uL    Hemoglobin 11.6 (L) 12.0 - 16.0 g/dL    Hematocrit 34.4 (L) 36.0 - 46.0 %    MCV 92 80 - 100 fL    MCH 30.9 26.0 - 34.0 pg    MCHC 33.7 32.0 - 36.0 g/dL    RDW 13.8 11.5 - 14.5 %    Platelets 224 150 - 450 x10*3/uL   Basic metabolic panel   Result Value Ref Range    Glucose 186 (H) 74 - 99 mg/dL    Sodium 134 (L) 136 - 145 mmol/L    Potassium 3.7 3.5 - 5.3 mmol/L    Chloride 101 98 - 107 mmol/L    Bicarbonate 26 21 - 32 mmol/L    Anion Gap 11 10 - 20 mmol/L    Urea Nitrogen 14 6 - 23 mg/dL    Creatinine 0.65 0.50 - 1.05 mg/dL    eGFR >90 >60 mL/min/1.73m*2    Calcium 8.4 (L) 8.6 - 10.3 mg/dL   POCT GLUCOSE   Result Value Ref Range    POCT Glucose 213 (H) 74 - 99 mg/dL       XR chest 1 view    Result Date: 12/9/2023  Interpreted By:   Star Morales, STUDY: XR CHEST 1 VIEW;  12/9/2023 6:13 pm   INDICATION: Signs/Symptoms:sudden onset rigors.   COMPARISON: Chest radiograph 04/14/2023   ACCESSION NUMBER(S): [id number]   ORDERING CLINICIAN: MELIZA LARSEN   FINDINGS: SUPPORT DEVICES: None.   CARDIOMEDIASTINAL SILHOUETTE: Cardiomediastinal silhouette is normal in size and configuration.   LUNGS: No pulmonary consolidation, pleural effusion or pneumothorax.   ABDOMEN: No remarkable upper abdominal findings.   BONES: No acute osseous abnormality.       1. No acute cardiopulmonary process.     Signed by: Star Morales 12/9/2023 6:24 PM Dictation workstation:   [id number]    MR shoulder left wo IV contrast    Result Date: 12/6/2023  Interpreted By:  Luis Eduardo Olmstead and Guraya Sahejmeet STUDY: MRI of the left shoulder without IV contrast   INDICATION: Signs/Symptoms:limited rom   COMPARISON: Shoulder radiographs 06/15/2020.   ACCESSION NUMBER(S): [id number]   ORDERING CLINICIAN: STAR THURMAN   TECHNIQUE: Multiplanar multisequence MRI of the left shoulder was performed without intravenous contrast.   FINDINGS: Acromioclavicular Joint:  Severe degenerative changes of the acromioclavicular joint are noted.. A small volume of complex fluid is noted within the subacromial subdeltoid bursa..   Biceps Tendon: The biceps tendon is not visualized, it is torn and retracted out of the field of view..   Rotator Cuff: There is a full-thickness tear involving the entirety of the supraspinatus and near entirety of the infraspinatus tendon.   There is a full-thickness tear involving the superior fibers of the supraspinatus tendon..   Muscles:  Severe fatty atrophy of the supraspinatus and infraspinatus muscle bellies are noted. There is also mild fatty atrophy of the subscapularis muscle belly.   Labrum: Diffuse and complex degenerative labral tearing is noted..   Articular Cartilage:  There is high-grade articular cartilage loss of the inferior/medial humeral head  and inferior glenoid fossa. Subchondral cystic changes of the inferior glenoid fossa are noted.   Bones:  Due to the above-described rotator cuff tears, there is superior migration of the humeral head with pseudoarticulation and acetabularization of the inferior surface of the acromion process. The marrow signal of the humeral head is within normal limits. No evidence acute fracture or contusion.   Nerves:  No evidence of mass effect in the region of the quadrilateral space or suprascapular nerve.   Other: A moderate volume joint effusion is noted with synovitis.       1. Chronic massive rotator cuff tear involving the entirety of the supraspinatus and infraspinatus tendons, as well as the superior fibers of the subscapularis tendon. There is severe fatty atrophy of the supraspinatus and infraspinatus muscle bellies, as well as mild fatty atrophy of the subscapularis muscle belly. 2. Severe degenerative changes acromioclavicular joint are noted. There also moderate to severe degenerative changes of the glenohumeral joint. A moderate volume joint effusion is noted with synovitis. 3. The long head biceps tendon is torn and retracted out of the field of view.   MACRO: None.   Signed by: Luis Eduardo Olmstead 12/6/2023 4:00 PM Dictation workstation:   [id number]    [facility] SPECIAL ORDER    Result Date: 11/15/2023  Date of Procedure 11/15/2023. Interpretation Right Eye Normal foveal contour. Findings include Intraretinal fluid. Left Eye Normal without fluid. Findings include Negative for Intraretinal fluid. Interval Change Right Eye Stable. Left Eye Stable.           Assessment/Plan   Principal Problem:    Sepsis due to urinary tract infection (CMS/[facility])      Nahomi Saavedra is a 68 y.o. female presenting with chills and rigor.  She reports that the symptoms started yesterday.  She reports that her daughter had just left her house, and within 30 minutes she started experiencing these chills and full body rigors.  She reports  that the tremors were so bad that she was unable to dial her phone and had to ask her phone to dial for her.  She denies any urinary symptoms or cough.  She reports that she did check her temperature yesterday and did not have a fever.  However she did develop a fever of 102.7 overnight.     PMHX: DM, HTN, HLD, asthma, DVT in pregnancy, Hep C, bipolar, PTSD    UTI  -Urine positive for WBCs and bacteria  -Await culture  -Chest x-ray negative  -Febrile overnight at 39.3 (102.7F)  -Slightly tachycardic at 102/104 on admission  -Leukocytosis 14.4-> 14.6  -IV cipro  -lactate neg    DM  -Continue Lantus (takes Levemir at home)  -Continue Humalog 75/25  -Correctional sliding scale  -Accu-Cheks ACHS    HTN  -Resume home meds    DVT prophylaxis:  Lovenox, SCD    DC plan:  DC home when medically stable    Labs/Testing reviewed    Interdisciplinary team rounding completed with hospitalist, nurse, TCC    NP discussed plan and lab/testing results with Dr. Naty PELAEZ spent 45 minutes in the professional and overall care of this patient.      Zoraida Bruner, APRN-CNP

## 2023-12-10 NOTE — ED PROVIDER NOTES
HPI   Chief Complaint   Patient presents with    Chills     Patient comes in from home with complaints of chills. Patient is also febrile. Patient also complains of left should pain discomfort. Patient also states that she has urinary frequency.        HPI: 68-year-old female with a history of type 2 diabetes, HTN and HLD presents for rigors.  She was at home when she had the acute onset of chills/rigors.  She was asymptomatic before this.  She does report urinary frequency over the past several days and is denying flank pain.  She denies cough, headache, sick contacts, shortness of breath, chest pain, abdominal pain, diarrhea and nausea/vomiting      Limitations to history: None  Independent Historians: Patient  External Records Reviewed: HIE, outpatient notes, inpatient notes  ------------------------------------------------------------------------------------------------------------------------------------------  ROS: a ten point review of systems was performed and was negative except as per HPI.  ------------------------------------------------------------------------------------------------------------------------------------------  PMH / PSH: as per HPI, otherwise reviewed in EMR  MEDS: as per HPI, otherwise reviewed in EMR  ALLERGIES: as per HPI, otherwise reviewed in EMR  SocH:  as per HPI, otherwise reviewed in EMR  FH:  as per HPI, otherwise reviewed in EMR  ------------------------------------------------------------------------------------------------------------------------------------------  Physical Exam:  VS: As documented in the triage note and EMR flowsheet from this visit was reviewed  General: Well appearing. No acute distress.   Eyes:  Extraocular movements grossly intact. No scleral icterus. No discharge  HEENT:  Normocephalic.  Atraumatic  Neck: Moves neck freely. No gross masses  CV: Regular rhythm. No murmurs, rubs or gallops   Resp: Clear to auscultation bilaterally. No respiratory  distress.    GI: Soft, no masses, nontender. No rebound tenderness or guarding  MSK: Symmetric muscle bulk. No deformities. No lower extremity edema.    Skin: Warm, dry, intact.   Neuro: No focal deficits.  A&O x3.   Psych: Appropriate for situation  ------------------------------------------------------------------------------------------------------------------------------------------  Hospital Course / Medical Decision Making:  Independent Interpretations: CXR  EKG as interpreted by me: ROMAN    MDM: This is a 68-year-old female with a history of diabetes, HTN and HLD presenting for rigors.  She is found to be febrile in the ED was given antipyretics.  She is negative for influenza and COVID-19.  Workup is positive for urinary tract infection.  Due to her allergies she was given Cipro.  On reevaluation her tachycardia has resolved and she does feel better.  I conducted shared decision-making with the patient and she does not feel safe going home and I do agree that she should be admitted for further evaluation.  Patient was signed out to the oncoming provider, Dr. Mcclain, pending admission    Discussion of Management with Other Providers:   I discussed the patient/results with: Emergency medicine team    Final diagnosis and disposition as below.    Results for orders placed or performed during the hospital encounter of 12/09/23  -CBC:        Result                      Value             Ref Range           WBC                         14.4 (H)          4.4 - 11.3 x*       nRBC                        0.0               0.0 - 0.0 /1*       RBC                         4.51              4.00 - 5.20 *       Hemoglobin                  13.6              12.0 - 16.0 *       Hematocrit                  41.6              36.0 - 46.0 %       MCV                         92                80 - 100 fL         MCH                         30.2              26.0 - 34.0 *       MCHC                        32.7              32.0 -  36.0 *       RDW                         13.7              11.5 - 14.5 %       Platelets                   296               150 - 450 x1*  -Comprehensive metabolic panel:        Result                      Value             Ref Range           Glucose                     123 (H)           74 - 99 mg/dL       Sodium                      138               136 - 145 mm*       Potassium                   4.2               3.5 - 5.3 mm*       Chloride                    100               98 - 107 mmo*       Bicarbonate                 28                21 - 32 mmol*       Anion Gap                   14                10 - 20 mmol*       Urea Nitrogen               14                6 - 23 mg/dL        Creatinine                  0.68              0.50 - 1.05 *       eGFR                        >90               >60 mL/min/1*       Calcium                     9.5               8.6 - 10.3 m*       Albumin                     4.3               3.4 - 5.0 g/*       Alkaline Phosphatase        87                33 - 136 U/L        Total Protein               7.7               6.4 - 8.2 g/*       AST                         16                9 - 39 U/L          Bilirubin, Total            0.6               0.0 - 1.2 mg*       ALT                         8                 7 - 45 U/L     -Sars-CoV-2 and Influenza A/B PCR:        Result                      Value             Ref Range           Flu A Result                Not Detected      Not Detected        Flu B Result                Not Detected      Not Detected        Coronavirus 2019, PCR       Not Detected      Not Detected   -RSV PCR:        Result                      Value             Ref Range           RSV PCR                     Not Detected      Not Detected   -Urinalysis with Reflex Microscopic:        Result                      Value             Ref Range           Color, Urine                Yellow            Straw, Yellow       Appearance, Urine           Hazy  (N)          Clear               Specific Gravity, Urine     1.012             1.005 - 1.035       pH, Urine                   5.0               5.0, 5.5, 6.*       Protein, Urine              NEGATIVE          NEGATIVE mg/*       Glucose, Urine              NEGATIVE          NEGATIVE mg/*       Blood, Urine                NEGATIVE          NEGATIVE            Ketones, Urine              NEGATIVE          NEGATIVE mg/*       Bilirubin, Urine            NEGATIVE          NEGATIVE            Urobilinogen, Urine         <2.0              <2.0 mg/dL          Nitrite, Urine              NEGATIVE          NEGATIVE            Leukocyte Esterase, Ur*                       NEGATIVE        SMALL (1+) (A)  -Microscopic Only, Urine:        Result                      Value             Ref Range           WBC, Urine                  6-10 (A)          1-5, NONE /H*       RBC, Urine                  NONE              NONE, 1-2, 3*       Squamous Epithelial Ce*     1-9 (SPARSE)      Reference ra*       Bacteria, Urine             4+ (A)            NONE SEEN /H*       Mucus, Urine                3+                Reference ra*       Hyaline Casts, Urine                          NONE /LPF       OCCASIONAL (A)  XR chest 1 view   Final Result    1. No acute cardiopulmonary process.                Signed by: Ozzy Morales 12/9/2023 6:24 PM    Dictation workstation:   [id number]                               No data recorded                Patient History   Past Medical History:   Diagnosis Date    Anxiety disorder, unspecified 03/02/2020    Anxiety    Arcus senilis, unspecified eye 11/29/2016    Senile corneal change    Bipolar disorder, unspecified (CMS/[facility]) 11/29/2016    Bipolar disorder    Chronic obstructive pulmonary disease, unspecified (CMS/[facility]) 11/29/2016    Chronic obstructive lung disease    Cramp and spasm 01/04/2017    Spasm    Insomnia, unspecified 01/04/2017    Insomnia    Low back pain, unspecified 03/02/2020    Chronic  lower back pain    Obesity, unspecified 11/29/2016    Obesity    Polyneuropathy, unspecified 01/04/2017    Peripheral polyneuropathy    Spondylosis without myelopathy or radiculopathy, lumbar region 02/28/2017    Lumbar spondylosis    Spondylosis without myelopathy or radiculopathy, lumbar region 01/04/2017    Facet syndrome, lumbar    Type 2 diabetes mellitus without complications (CMS/[facility]) 03/02/2020    Type 2 diabetes mellitus    Unspecified disturbances of skin sensation 02/28/2017    Sensory disturbance    Unspecified urinary incontinence 01/04/2017    Urinary incontinence    Zoster without complications 11/29/2016    Herpes zoster     Past Surgical History:   Procedure Laterality Date    OTHER SURGICAL HISTORY  06/30/2021    Shoulder arthroscopy    US GUIDED THYROID BIOPSY  3/7/2022    US GUIDED THYROID BIOPSY 3/7/2022     Family History   Problem Relation Name Age of Onset    Depression Mother      Diabetes Mother      Other (essential hypertension) Mother      Cancer Mother       Social History     Tobacco Use    Smoking status: Every Day     Packs/day: 0.50     Years: 28.00     Additional pack years: 0.00     Total pack years: 14.00     Types: Cigarettes    Smokeless tobacco: Never   Vaping Use    Vaping Use: Never used   Substance Use Topics    Alcohol use: Yes    Drug use: Not Currently       Physical Exam   ED Triage Vitals   Temp Heart Rate Resp BP   12/09/23 1749 12/09/23 1749 12/09/23 1800 12/09/23 1749   39.3 °C (102.7 °F) 102 20 151/77      SpO2 Temp Source Heart Rate Source Patient Position   12/09/23 1749 12/09/23 1749 12/09/23 1749 --   94 % Oral Monitor       BP Location FiO2 (%)     -- --             Physical Exam    ED Course & MDM   Diagnoses as of 12/10/23 1121   Sepsis secondary to UTI (CMS/[facility])       Medical Decision Making      Procedure  Procedures     Shayan Bellamy,   12/10/23 1123

## 2023-12-10 NOTE — PROGRESS NOTES
12/10/23 1300   Discharge Planning   Living Arrangements Alone   Support Systems Family members   Assistance Needed Patient lives alone, independnet in ADLS/ IADLS. Ambulatory without assitive devices.   Type of Residence Private residence   Number of Stairs to Enter Residence 1  (Multi- Unit Dwelling. Patient lives in Unit #1.)   Number of Stairs Within Residence 0   Do you have animals or pets at home? No   Home or Post Acute Services None   Patient expects to be discharged to: Home   Does the patient need discharge transport arranged? Yes   RoundTrip coordination needed? Yes     12/10/2023 1303 Request to SW if she finds availability to consult today regarding food insecurities mentioned per Admission RN assessment. TCC will place patient on weekend sign out for SW follow up. No skilled needs identified at this time. Medical management and monitoring ongoing for Sepsis/ UTI/ Fever. Jing HUNTER RN TCC CCM

## 2023-12-11 VITALS
OXYGEN SATURATION: 97 % | BODY MASS INDEX: 38.99 KG/M2 | DIASTOLIC BLOOD PRESSURE: 73 MMHG | HEART RATE: 68 BPM | SYSTOLIC BLOOD PRESSURE: 148 MMHG | WEIGHT: 228.4 LBS | RESPIRATION RATE: 17 BRPM | HEIGHT: 64 IN | TEMPERATURE: 97.2 F

## 2023-12-11 LAB
ANION GAP SERPL CALC-SCNC: 9 MMOL/L (ref 10–20)
BACTERIA UR CULT: NORMAL
BUN SERPL-MCNC: 11 MG/DL (ref 6–23)
CALCIUM SERPL-MCNC: 8.5 MG/DL (ref 8.6–10.3)
CHLORIDE SERPL-SCNC: 103 MMOL/L (ref 98–107)
CO2 SERPL-SCNC: 28 MMOL/L (ref 21–32)
CREAT SERPL-MCNC: 0.6 MG/DL (ref 0.5–1.05)
ERYTHROCYTE [DISTWIDTH] IN BLOOD BY AUTOMATED COUNT: 13.5 % (ref 11.5–14.5)
GFR SERPL CREATININE-BSD FRML MDRD: >90 ML/MIN/1.73M*2
GLUCOSE BLD MANUAL STRIP-MCNC: 164 MG/DL (ref 74–99)
GLUCOSE SERPL-MCNC: 203 MG/DL (ref 74–99)
HCT VFR BLD AUTO: 34.7 % (ref 36–46)
HGB BLD-MCNC: 11.7 G/DL (ref 12–16)
MCH RBC QN AUTO: 30.4 PG (ref 26–34)
MCHC RBC AUTO-ENTMCNC: 33.7 G/DL (ref 32–36)
MCV RBC AUTO: 90 FL (ref 80–100)
NRBC BLD-RTO: 0 /100 WBCS (ref 0–0)
PLATELET # BLD AUTO: 219 X10*3/UL (ref 150–450)
POTASSIUM SERPL-SCNC: 3.6 MMOL/L (ref 3.5–5.3)
RBC # BLD AUTO: 3.85 X10*6/UL (ref 4–5.2)
SODIUM SERPL-SCNC: 136 MMOL/L (ref 136–145)
WBC # BLD AUTO: 6.7 X10*3/UL (ref 4.4–11.3)

## 2023-12-11 PROCEDURE — 80048 BASIC METABOLIC PNL TOTAL CA: CPT | Performed by: NURSE PRACTITIONER

## 2023-12-11 PROCEDURE — 82947 ASSAY GLUCOSE BLOOD QUANT: CPT

## 2023-12-11 PROCEDURE — G0378 HOSPITAL OBSERVATION PER HR: HCPCS

## 2023-12-11 PROCEDURE — 96374 THER/PROPH/DIAG INJ IV PUSH: CPT | Mod: 59

## 2023-12-11 PROCEDURE — 36415 COLL VENOUS BLD VENIPUNCTURE: CPT | Performed by: NURSE PRACTITIONER

## 2023-12-11 PROCEDURE — 99233 SBSQ HOSP IP/OBS HIGH 50: CPT | Performed by: NURSE PRACTITIONER

## 2023-12-11 PROCEDURE — 2500000004 HC RX 250 GENERAL PHARMACY W/ HCPCS (ALT 636 FOR OP/ED): Performed by: NURSE PRACTITIONER

## 2023-12-11 PROCEDURE — 85027 COMPLETE CBC AUTOMATED: CPT | Performed by: NURSE PRACTITIONER

## 2023-12-11 RX ORDER — CIPROFLOXACIN 500 MG/1
500 TABLET ORAL 2 TIMES DAILY
Qty: 6 TABLET | Refills: 0 | Status: SHIPPED | OUTPATIENT
Start: 2023-12-11 | End: 2023-12-14

## 2023-12-11 RX ADMIN — CIPROFLOXACIN 400 MG: 400 INJECTION, SOLUTION INTRAVENOUS at 09:14

## 2023-12-11 ASSESSMENT — COGNITIVE AND FUNCTIONAL STATUS - GENERAL
DAILY ACTIVITIY SCORE: 18
MOBILITY SCORE: 18
MOVING FROM LYING ON BACK TO SITTING ON SIDE OF FLAT BED WITH BEDRAILS: A LITTLE
WALKING IN HOSPITAL ROOM: A LITTLE
DRESSING REGULAR LOWER BODY CLOTHING: A LITTLE
MOVING TO AND FROM BED TO CHAIR: A LITTLE
PERSONAL GROOMING: A LITTLE
DRESSING REGULAR UPPER BODY CLOTHING: A LITTLE
TURNING FROM BACK TO SIDE WHILE IN FLAT BAD: A LITTLE
CLIMB 3 TO 5 STEPS WITH RAILING: A LITTLE
HELP NEEDED FOR BATHING: A LITTLE
TOILETING: A LITTLE
STANDING UP FROM CHAIR USING ARMS: A LITTLE
EATING MEALS: A LITTLE

## 2023-12-11 ASSESSMENT — PAIN - FUNCTIONAL ASSESSMENT
PAIN_FUNCTIONAL_ASSESSMENT: 0-10
PAIN_FUNCTIONAL_ASSESSMENT: 0-10

## 2023-12-11 ASSESSMENT — PAIN SCALES - GENERAL
PAINLEVEL_OUTOF10: 0 - NO PAIN
PAINLEVEL_OUTOF10: 3

## 2023-12-11 NOTE — NURSING NOTE
Pt. States she takes humalog 75/25 at home, but humalog unmixed (short acting), makes her itch. SHANDA Lemus NP made aware. Order to state pt. Refuses rather than discontinue order. Nahomi Saavedra took her own Humalog 75/25 mix, as it was not available from pharmacy. Counseled against using her own medication. Pt. States she will use ours when available.

## 2023-12-11 NOTE — PROGRESS NOTES
"Nahomi Saavedra is a 68 y.o. female on day 0 of admission presenting with Sepsis due to urinary tract infection (CMS/HCC).    Subjective   Awake in bed, eating breakfast. Would like to go home this AM.        Objective     Physical Exam  Constitutional:       Appearance: Normal appearance.   Cardiovascular:      Rate and Rhythm: Normal rate and regular rhythm.      Pulses: Normal pulses.      Heart sounds: Normal heart sounds. No murmur heard.     No gallop.   Pulmonary:      Effort: Pulmonary effort is normal. No respiratory distress.      Breath sounds: Normal breath sounds. No wheezing or rhonchi.   Abdominal:      General: Abdomen is flat. There is no distension.      Palpations: Abdomen is soft.      Tenderness: There is no abdominal tenderness. There is no guarding.   Musculoskeletal:         General: Normal range of motion.   Skin:     General: Skin is warm.      Capillary Refill: Capillary refill takes less than 2 seconds.   Neurological:      Mental Status: She is alert and oriented to person, place, and time.   Psychiatric:         Mood and Affect: Mood normal.         Thought Content: Thought content normal.         Judgment: Judgment normal.         Last Recorded Vitals  Blood pressure 150/82, pulse 72, temperature 37.1 °C (98.8 °F), temperature source Oral, resp. rate 18, height 1.626 m (5' 4.02\"), weight 104 kg (228 lb 6.3 oz), SpO2 96 %.  Intake/Output last 3 Shifts:  I/O last 3 completed shifts:  In: 665 (6.4 mL/kg) [I.V.:465 (4.5 mL/kg); IV Piggyback:200]  Out: - (0 mL/kg)   Weight: 103.6 kg     Relevant Results  Scheduled medications  acetaminophen, 975 mg, oral, Once  amLODIPine, 10 mg, oral, Daily  ciprofloxacin, 400 mg, intravenous, q12h  enoxaparin, 40 mg, subcutaneous, q24h  insulin glargine, 8 Units, subcutaneous, Nightly  insulin lispro, 0-10 Units, subcutaneous, TID with meals  insulin lispro protamin-lispro, 16 Units, subcutaneous, Daily before evening meal  insulin lispro protamin-lispro, " 26 Units, subcutaneous, Daily before breakfast  sennosides, 2 tablet, oral, BID      Continuous medications  sodium chloride 0.9%, 100 mL/hr, Last Rate: 100 mL/hr (12/10/23 0543)  sodium chloride 0.9%, 75 mL/hr, Last Rate: 75 mL/hr (12/10/23 1501)      PRN medications  PRN medications: acetaminophen, acetaminophen, albuterol, dextrose 10 % in water (D10W), dextrose, diphenhydrAMINE, glucagon, ondansetron ODT **OR** ondansetron    Results for orders placed or performed during the hospital encounter of 12/09/23 (from the past 24 hour(s))   Lactate   Result Value Ref Range    Lactate 0.7 0.4 - 2.0 mmol/L   POCT GLUCOSE   Result Value Ref Range    POCT Glucose 213 (H) 74 - 99 mg/dL   POCT GLUCOSE   Result Value Ref Range    POCT Glucose 171 (H) 74 - 99 mg/dL   POCT GLUCOSE   Result Value Ref Range    POCT Glucose 199 (H) 74 - 99 mg/dL   POCT GLUCOSE   Result Value Ref Range    POCT Glucose 215 (H) 74 - 99 mg/dL   CBC   Result Value Ref Range    WBC 6.7 4.4 - 11.3 x10*3/uL    nRBC 0.0 0.0 - 0.0 /100 WBCs    RBC 3.85 (L) 4.00 - 5.20 x10*6/uL    Hemoglobin 11.7 (L) 12.0 - 16.0 g/dL    Hematocrit 34.7 (L) 36.0 - 46.0 %    MCV 90 80 - 100 fL    MCH 30.4 26.0 - 34.0 pg    MCHC 33.7 32.0 - 36.0 g/dL    RDW 13.5 11.5 - 14.5 %    Platelets 219 150 - 450 x10*3/uL   Basic metabolic panel   Result Value Ref Range    Glucose 203 (H) 74 - 99 mg/dL    Sodium 136 136 - 145 mmol/L    Potassium 3.6 3.5 - 5.3 mmol/L    Chloride 103 98 - 107 mmol/L    Bicarbonate 28 21 - 32 mmol/L    Anion Gap 9 (L) 10 - 20 mmol/L    Urea Nitrogen 11 6 - 23 mg/dL    Creatinine 0.60 0.50 - 1.05 mg/dL    eGFR >90 >60 mL/min/1.73m*2    Calcium 8.5 (L) 8.6 - 10.3 mg/dL     XR chest 1 view    Result Date: 12/9/2023  Interpreted By:  Ozzy Morales, STUDY: XR CHEST 1 VIEW;  12/9/2023 6:13 pm   INDICATION: Signs/Symptoms:sudden onset rigors.   COMPARISON: Chest radiograph 04/14/2023   ACCESSION NUMBER(S): [id number]   ORDERING CLINICIAN: MEILZA LARSEN   FINDINGS:  SUPPORT DEVICES: None.   CARDIOMEDIASTINAL SILHOUETTE: Cardiomediastinal silhouette is normal in size and configuration.   LUNGS: No pulmonary consolidation, pleural effusion or pneumothorax.   ABDOMEN: No remarkable upper abdominal findings.   BONES: No acute osseous abnormality.       1. No acute cardiopulmonary process.     Signed by: Star Morales 12/9/2023 6:24 PM Dictation workstation:   [id number]    MR shoulder left wo IV contrast    Result Date: 12/6/2023  Interpreted By:  Luis Eduardo Olmstead and Guraya Sahejmeet STUDY: MRI of the left shoulder without IV contrast   INDICATION: Signs/Symptoms:limited rom   COMPARISON: Shoulder radiographs 06/15/2020.   ACCESSION NUMBER(S): [id number]   ORDERING CLINICIAN: STAR THURMAN   TECHNIQUE: Multiplanar multisequence MRI of the left shoulder was performed without intravenous contrast.   FINDINGS: Acromioclavicular Joint:  Severe degenerative changes of the acromioclavicular joint are noted.. A small volume of complex fluid is noted within the subacromial subdeltoid bursa..   Biceps Tendon: The biceps tendon is not visualized, it is torn and retracted out of the field of view..   Rotator Cuff: There is a full-thickness tear involving the entirety of the supraspinatus and near entirety of the infraspinatus tendon.   There is a full-thickness tear involving the superior fibers of the supraspinatus tendon..   Muscles:  Severe fatty atrophy of the supraspinatus and infraspinatus muscle bellies are noted. There is also mild fatty atrophy of the subscapularis muscle belly.   Labrum: Diffuse and complex degenerative labral tearing is noted..   Articular Cartilage:  There is high-grade articular cartilage loss of the inferior/medial humeral head and inferior glenoid fossa. Subchondral cystic changes of the inferior glenoid fossa are noted.   Bones:  Due to the above-described rotator cuff tears, there is superior migration of the humeral head with pseudoarticulation and  acetabularization of the inferior surface of the acromion process. The marrow signal of the humeral head is within normal limits. No evidence acute fracture or contusion.   Nerves:  No evidence of mass effect in the region of the quadrilateral space or suprascapular nerve.   Other: A moderate volume joint effusion is noted with synovitis.       1. Chronic massive rotator cuff tear involving the entirety of the supraspinatus and infraspinatus tendons, as well as the superior fibers of the subscapularis tendon. There is severe fatty atrophy of the supraspinatus and infraspinatus muscle bellies, as well as mild fatty atrophy of the subscapularis muscle belly. 2. Severe degenerative changes acromioclavicular joint are noted. There also moderate to severe degenerative changes of the glenohumeral joint. A moderate volume joint effusion is noted with synovitis. 3. The long head biceps tendon is torn and retracted out of the field of view.   MACRO: None.   Signed by: Luis Eduardo Olmstead 12/6/2023 4:00 PM Dictation workstation:   [id number]    [facility] SPECIAL ORDER    Result Date: 11/15/2023  Date of Procedure 11/15/2023. Interpretation Right Eye Normal foveal contour. Findings include Intraretinal fluid. Left Eye Normal without fluid. Findings include Negative for Intraretinal fluid. Interval Change Right Eye Stable. Left Eye Stable.                     Assessment/Plan   Principal Problem:    Sepsis due to urinary tract infection (CMS/[facility])    Nahomi Saavedra is a 68 y.o. female presenting with chills and rigor.  She reports that the symptoms started yesterday.  She reports that her daughter had just left her house, and within 30 minutes she started experiencing these chills and full body rigors.  She reports that the tremors were so bad that she was unable to dial her phone and had to ask her phone to dial for her.  She denies any urinary symptoms or cough.  She reports that she did check her temperature yesterday and did not  have a fever.  However she did develop a fever of 102.7 overnight.        PMHX: DM, HTN, HLD, asthma, DVT in pregnancy, Hep C, bipolar, PTSD     UTI  -Urine positive for WBCs and bacteria  -Await culture  -Chest x-ray negative  -Febrile overnight at 39.3 (102.7F)  -Slightly tachycardic at 102/104 on admission  -Leukocytosis 14.4-> 14.6  -IV cipro  -lactate neg     DM  -Continue Lantus (takes Levemir at home)  -Continue Humalog 75/25  -Correctional sliding scale  -Accu-Cheks ACHS     HTN  -Resume home meds     DVT prophylaxis:  Lovenox, SCD     DC plan:  DC home when medically stable     Labs/Testing reviewed     Interdisciplinary team rounding completed with hospitalist, nurse, TCC     NP discussed plan and lab/testing results with Dr. Alfonso. Been afebrile for >24 hrs, WBC normalized. DC on orals           I spent 45 minutes in the professional and overall care of this patient.      Zoraida Bruner, APRN-CNP

## 2023-12-11 NOTE — DISCHARGE SUMMARY
"Nahomi Saavedra is a 68 y.o. female on day 0 of admission presenting with Sepsis due to urinary tract infection (CMS/HCC).    Subjective   Awake in bed, eating breakfast. Would like to go home this AM.         Your medication list        START taking these medications        Instructions Last Dose Given Next Dose Due   ciprofloxacin 500 mg tablet  Commonly known as: Cipro      Take 1 tablet (500 mg) by mouth 2 times a day for 3 days.              CONTINUE taking these medications        Instructions Last Dose Given Next Dose Due   albuterol 2.5 mg /3 mL (0.083 %) nebulizer solution           Ventolin HFA 90 mcg/actuation inhaler  Generic drug: albuterol           Allergy (diphenhydrAMINE) 25 mg capsule  Generic drug: diphenhydrAMINE           amLODIPine 10 mg tablet  Commonly known as: Norvasc           BD Ultra-Fine Micro Pen Needle 32 gauge x 1/4\" needle  Generic drug: pen needle, diabetic           carboxymethylcellulose 0.5 % ophthalmic solution  Commonly known as: Refresh Plus           diazePAM 5 mg tablet  Commonly known as: Valium           HumaLOG Mix 75-25 KwikPen 100 unit/mL (75-25) injection  Generic drug: insulin lispro protamin-lispro           hydrALAZINE 25 mg tablet  Commonly known as: Apresoline           hydroCHLOROthiazide 25 mg tablet  Commonly known as: HYDRODiuril            mg tablet  Generic drug: ibuprofen           Levemir FlexTouch U100 Insulin 100 unit/mL (3 mL) pen  Generic drug: insulin detemir           Lidoderm 5 % patch  Generic drug: lidocaine           OneTouch Delica Plus Lancet 30 gauge misc  Generic drug: lancets           OneTouch Verio test strips strip  Generic drug: blood sugar diagnostic           oxyCODONE-acetaminophen  mg tablet  Commonly known as: Percocet           oxyCODONE-acetaminophen 5-325 mg tablet  Commonly known as: Percocet           sertraline 50 mg tablet  Commonly known as: Zoloft           triamcinolone 0.5 % cream  Commonly known as: " "Kenalog           Xarelto 2.5 mg tablet  Generic drug: rivaroxaban                     Where to Get Your Medications        These medications were sent to [facility]/pharmacy #[id number] - [address] AT [facility]NER OF [facility]  [address] [address]      Phone: [phone]   ciprofloxacin 500 mg tablet           Objective     Physical Exam  Constitutional:       Appearance: Normal appearance.   Cardiovascular:      Rate and Rhythm: Normal rate and regular rhythm.      Pulses: Normal pulses.      Heart sounds: Normal heart sounds. No murmur heard.     No gallop.   Pulmonary:      Effort: Pulmonary effort is normal. No respiratory distress.      Breath sounds: Normal breath sounds. No wheezing or rhonchi.   Abdominal:      General: Abdomen is flat. There is no distension.      Palpations: Abdomen is soft.      Tenderness: There is no abdominal tenderness. There is no guarding.   Musculoskeletal:         General: Normal range of motion.   Skin:     General: Skin is warm.      Capillary Refill: Capillary refill takes less than 2 seconds.   Neurological:      Mental Status: She is alert and oriented to person, place, and time.   Psychiatric:         Mood and Affect: Mood normal.         Thought Content: Thought content normal.         Judgment: Judgment normal.         Last Recorded Vitals  Blood pressure 148/73, pulse 68, temperature 36.2 °C (97.2 °F), temperature source Temporal, resp. rate 17, height 1.626 m (5' 4.02\"), weight 104 kg (228 lb 6.3 oz), SpO2 97 %.  Intake/Output last 3 Shifts:  I/O last 3 completed shifts:  In: 665 (6.4 mL/kg) [I.V.:465 (4.5 mL/kg); IV Piggyback:200]  Out: - (0 mL/kg)   Weight: 103.6 kg     Relevant Results  Scheduled medications  acetaminophen, 975 mg, oral, Once  amLODIPine, 10 mg, oral, Daily  ciprofloxacin, 400 mg, intravenous, q12h  enoxaparin, 40 mg, subcutaneous, q24h  insulin glargine, 8 Units, subcutaneous, Nightly  insulin lispro, 0-10 Units, subcutaneous, " TID with meals  insulin lispro protamin-lispro, 16 Units, subcutaneous, Daily before evening meal  insulin lispro protamin-lispro, 26 Units, subcutaneous, Daily before breakfast  sennosides, 2 tablet, oral, BID      Continuous medications  sodium chloride 0.9%, 100 mL/hr, Last Rate: 100 mL/hr (12/10/23 0543)  sodium chloride 0.9%, 75 mL/hr, Last Rate: 75 mL/hr (12/10/23 1501)      PRN medications  PRN medications: acetaminophen, acetaminophen, albuterol, dextrose 10 % in water (D10W), dextrose, diphenhydrAMINE, glucagon, ondansetron ODT **OR** ondansetron    Results for orders placed or performed during the hospital encounter of 12/09/23 (from the past 24 hour(s))   POCT GLUCOSE   Result Value Ref Range    POCT Glucose 171 (H) 74 - 99 mg/dL   POCT GLUCOSE   Result Value Ref Range    POCT Glucose 199 (H) 74 - 99 mg/dL   POCT GLUCOSE   Result Value Ref Range    POCT Glucose 215 (H) 74 - 99 mg/dL   CBC   Result Value Ref Range    WBC 6.7 4.4 - 11.3 x10*3/uL    nRBC 0.0 0.0 - 0.0 /100 WBCs    RBC 3.85 (L) 4.00 - 5.20 x10*6/uL    Hemoglobin 11.7 (L) 12.0 - 16.0 g/dL    Hematocrit 34.7 (L) 36.0 - 46.0 %    MCV 90 80 - 100 fL    MCH 30.4 26.0 - 34.0 pg    MCHC 33.7 32.0 - 36.0 g/dL    RDW 13.5 11.5 - 14.5 %    Platelets 219 150 - 450 x10*3/uL   Basic metabolic panel   Result Value Ref Range    Glucose 203 (H) 74 - 99 mg/dL    Sodium 136 136 - 145 mmol/L    Potassium 3.6 3.5 - 5.3 mmol/L    Chloride 103 98 - 107 mmol/L    Bicarbonate 28 21 - 32 mmol/L    Anion Gap 9 (L) 10 - 20 mmol/L    Urea Nitrogen 11 6 - 23 mg/dL    Creatinine 0.60 0.50 - 1.05 mg/dL    eGFR >90 >60 mL/min/1.73m*2    Calcium 8.5 (L) 8.6 - 10.3 mg/dL   POCT GLUCOSE   Result Value Ref Range    POCT Glucose 164 (H) 74 - 99 mg/dL     XR chest 1 view    Result Date: 12/9/2023  Interpreted By:  Ozzy Morales, STUDY: XR CHEST 1 VIEW;  12/9/2023 6:13 pm   INDICATION: Signs/Symptoms:sudden onset rigors.   COMPARISON: Chest radiograph 04/14/2023   ACCESSION  NUMBER(S): [id number]   ORDERING CLINICIAN: MELIZA LARSEN   FINDINGS: SUPPORT DEVICES: None.   CARDIOMEDIASTINAL SILHOUETTE: Cardiomediastinal silhouette is normal in size and configuration.   LUNGS: No pulmonary consolidation, pleural effusion or pneumothorax.   ABDOMEN: No remarkable upper abdominal findings.   BONES: No acute osseous abnormality.       1. No acute cardiopulmonary process.     Signed by: Star Morales 12/9/2023 6:24 PM Dictation workstation:   [id number]    MR shoulder left wo IV contrast    Result Date: 12/6/2023  Interpreted By:  Luis Eduardo Olmstead and Guraya Sahejmeet STUDY: MRI of the left shoulder without IV contrast   INDICATION: Signs/Symptoms:limited rom   COMPARISON: Shoulder radiographs 06/15/2020.   ACCESSION NUMBER(S): [id number]   ORDERING CLINICIAN: STAR THURMAN   TECHNIQUE: Multiplanar multisequence MRI of the left shoulder was performed without intravenous contrast.   FINDINGS: Acromioclavicular Joint:  Severe degenerative changes of the acromioclavicular joint are noted.. A small volume of complex fluid is noted within the subacromial subdeltoid bursa..   Biceps Tendon: The biceps tendon is not visualized, it is torn and retracted out of the field of view..   Rotator Cuff: There is a full-thickness tear involving the entirety of the supraspinatus and near entirety of the infraspinatus tendon.   There is a full-thickness tear involving the superior fibers of the supraspinatus tendon..   Muscles:  Severe fatty atrophy of the supraspinatus and infraspinatus muscle bellies are noted. There is also mild fatty atrophy of the subscapularis muscle belly.   Labrum: Diffuse and complex degenerative labral tearing is noted..   Articular Cartilage:  There is high-grade articular cartilage loss of the inferior/medial humeral head and inferior glenoid fossa. Subchondral cystic changes of the inferior glenoid fossa are noted.   Bones:  Due to the above-described rotator cuff tears, there is  superior migration of the humeral head with pseudoarticulation and acetabularization of the inferior surface of the acromion process. The marrow signal of the humeral head is within normal limits. No evidence acute fracture or contusion.   Nerves:  No evidence of mass effect in the region of the quadrilateral space or suprascapular nerve.   Other: A moderate volume joint effusion is noted with synovitis.       1. Chronic massive rotator cuff tear involving the entirety of the supraspinatus and infraspinatus tendons, as well as the superior fibers of the subscapularis tendon. There is severe fatty atrophy of the supraspinatus and infraspinatus muscle bellies, as well as mild fatty atrophy of the subscapularis muscle belly. 2. Severe degenerative changes acromioclavicular joint are noted. There also moderate to severe degenerative changes of the glenohumeral joint. A moderate volume joint effusion is noted with synovitis. 3. The long head biceps tendon is torn and retracted out of the field of view.   MACRO: None.   Signed by: Luis Eduardo Olmstead 12/6/2023 4:00 PM Dictation workstation:   [id number]    [facility] SPECIAL ORDER    Result Date: 11/15/2023  Date of Procedure 11/15/2023. Interpretation Right Eye Normal foveal contour. Findings include Intraretinal fluid. Left Eye Normal without fluid. Findings include Negative for Intraretinal fluid. Interval Change Right Eye Stable. Left Eye Stable.                     Assessment/Plan   Principal Problem:    Sepsis due to urinary tract infection (CMS/[facility])    Nahomi Saavedra is a 68 y.o. female presenting with chills and rigor.  She reports that the symptoms started yesterday.  She reports that her daughter had just left her house, and within 30 minutes she started experiencing these chills and full body rigors.  She reports that the tremors were so bad that she was unable to dial her phone and had to ask her phone to dial for her.  She denies any urinary symptoms or cough.   She reports that she did check her temperature yesterday and did not have a fever.  However she did develop a fever of 102.7 overnight.        PMHX: DM, HTN, HLD, asthma, DVT in pregnancy, Hep C, bipolar, PTSD     UTI  -Urine positive for WBCs and bacteria  -Await culture  -Chest x-ray negative  -Febrile overnight at 39.3 (102.7F)  -Slightly tachycardic at 102/104 on admission  -Leukocytosis 14.4-> 14.6  -IV cipro  -lactate neg     DM  -Continue Lantus (takes Levemir at home)  -Continue Humalog 75/25  -Correctional sliding scale  -Accu-Cheks ACHS     HTN  -Resume home meds     DVT prophylaxis:  Lovenox, SCD     DC plan:  DC home when medically stable     Labs/Testing reviewed     Interdisciplinary team rounding completed with hospitalist, nurse, TCC     NP discussed plan and lab/testing results with Dr. Alfonso. Been afebrile for >24 hrs, WBC normalized. DC on orals           I spent 45 minutes in the professional and overall care of this patient.      Zoraida Bruner, DESTINY-CNP

## 2024-02-01 ENCOUNTER — HOSPITAL ENCOUNTER (OUTPATIENT)
Dept: RADIOLOGY | Facility: HOSPITAL | Age: 69
Discharge: HOME | End: 2024-02-01
Payer: COMMERCIAL

## 2024-02-01 ENCOUNTER — OFFICE VISIT (OUTPATIENT)
Dept: PAIN MEDICINE | Facility: CLINIC | Age: 69
End: 2024-02-01
Payer: COMMERCIAL

## 2024-02-01 VITALS
DIASTOLIC BLOOD PRESSURE: 78 MMHG | HEART RATE: 87 BPM | RESPIRATION RATE: 20 BRPM | SYSTOLIC BLOOD PRESSURE: 161 MMHG | TEMPERATURE: 97 F

## 2024-02-01 DIAGNOSIS — M54.50 CHRONIC BILATERAL LOW BACK PAIN, UNSPECIFIED WHETHER SCIATICA PRESENT: Primary | ICD-10-CM

## 2024-02-01 DIAGNOSIS — G89.29 CHRONIC BILATERAL LOW BACK PAIN, UNSPECIFIED WHETHER SCIATICA PRESENT: ICD-10-CM

## 2024-02-01 DIAGNOSIS — G89.29 CHRONIC BILATERAL LOW BACK PAIN, UNSPECIFIED WHETHER SCIATICA PRESENT: Primary | ICD-10-CM

## 2024-02-01 DIAGNOSIS — M54.50 CHRONIC BILATERAL LOW BACK PAIN, UNSPECIFIED WHETHER SCIATICA PRESENT: ICD-10-CM

## 2024-02-01 PROCEDURE — 3078F DIAST BP <80 MM HG: CPT | Performed by: NURSE PRACTITIONER

## 2024-02-01 PROCEDURE — 72110 X-RAY EXAM L-2 SPINE 4/>VWS: CPT

## 2024-02-01 PROCEDURE — 3077F SYST BP >= 140 MM HG: CPT | Performed by: NURSE PRACTITIONER

## 2024-02-01 PROCEDURE — 3008F BODY MASS INDEX DOCD: CPT | Performed by: NURSE PRACTITIONER

## 2024-02-01 PROCEDURE — 99203 OFFICE O/P NEW LOW 30 MIN: CPT | Performed by: NURSE PRACTITIONER

## 2024-02-01 PROCEDURE — 1125F AMNT PAIN NOTED PAIN PRSNT: CPT | Performed by: NURSE PRACTITIONER

## 2024-02-01 PROCEDURE — 72110 X-RAY EXAM L-2 SPINE 4/>VWS: CPT | Performed by: RADIOLOGY

## 2024-02-01 ASSESSMENT — PAIN DESCRIPTION - DESCRIPTORS: DESCRIPTORS: SHARP

## 2024-02-01 ASSESSMENT — PAIN SCALES - GENERAL
PAINLEVEL: 8
PAINLEVEL_OUTOF10: 8

## 2024-02-01 ASSESSMENT — PAIN - FUNCTIONAL ASSESSMENT: PAIN_FUNCTIONAL_ASSESSMENT: 0-10

## 2024-02-01 NOTE — PROGRESS NOTES
Subjective   Patient ID: Nahomi Saavedra is a 68 y.o. female.    HPI BUTTOCKS PAIN RADIATING TO BILATERAL THIGHS    Review of Systems    Objective   Physical Exam    Assessment/Plan   There are no diagnoses linked to this encounter.

## 2024-02-01 NOTE — PROGRESS NOTES
Subjective   Patient ID: Nahomi Saavedra is a 68 y.o. female who presents for Pain (DOCUMENT PAIN ).  HPI  68 years old, female she is here today for a follow up. She is known in this clinic because of chronic left shoulder pain, chronic back pain. Today her main problem is the low back pain. States that her level of pain at this time 8/10. She denies any recent injury, falls or being involved in any MVA, reports that she had gone to Physical therapy, acupuncture, has pain medications as prescribed by her other Doctor. OARRS obtained and reviewed, no abuse or misuse with prescribed medication noted.  Chronic back pain is mostly in the lower back aggravated by certain movements.  She is able to perform activities of daily living with some difficulties because of the pain.  Review of Systems   Review of systems x 10 is negative.   No recent injury or falls reported.   No recent change in medical condition reported.   No recent weakness reported.   Still able to control bowel and bladder function.   Denies fever, cough, shortness of breath recently.   No interval change with medication/health issues reported.    Objective   Physical Exam  Awake,alert, no acute distress, appropriate.  Spine is of normal curvature.  Full ROM on all 4 extremities, sensation and motor intact, no vascular compromise.  No pedal edema, normal gait.  Skin warm, dry, intact, turgor is normal.  Denies any numbness, tingling.  /78 (BP Location: Left arm, Patient Position: Sitting, BP Cuff Size: Adult long)   Pulse 87   Temp 36.1 °C (97 °F) (Temporal)   Resp 20     Assessment/Plan     .Discussed options for this patient. Steroid injection versus nerve block. As of this time she  Wants nerve block done, she has had a lot of steroid shots done within the last 6 to 8 months.  States that she has had Physical therapy for her back in the past, but failed to help with the back pain.  Will obtain xray of the lumbar spine to further evaluate back  pain.   Follow up in 3 months time or as needed basis  Explained plan to this patient, and patient verbalized understanding and agreement with the plan. If there is questions or concerns, please feel free to contact me to clarify.    Chronic back pain associated with lumbago, lumbar spondylosis.         Ai Jacob, DESTINY-CNP 02/01/24 10:09 AM

## 2024-02-05 DIAGNOSIS — M47.816 LUMBAR SPONDYLOSIS: Primary | ICD-10-CM

## 2024-02-14 ENCOUNTER — ALLIED HEALTH (OUTPATIENT)
Dept: INTEGRATIVE MEDICINE | Facility: CLINIC | Age: 69
End: 2024-02-14
Payer: COMMERCIAL

## 2024-02-14 DIAGNOSIS — M54.2 CERVICALGIA: ICD-10-CM

## 2024-02-14 DIAGNOSIS — M54.59 OTHER LOW BACK PAIN: Primary | ICD-10-CM

## 2024-02-14 PROCEDURE — 97811 ACUP 1/> W/O ESTIM EA ADD 15: CPT | Performed by: ACUPUNCTURIST

## 2024-02-14 PROCEDURE — 97810 ACUP 1/> WO ESTIM 1ST 15 MIN: CPT | Performed by: ACUPUNCTURIST

## 2024-02-14 PROCEDURE — 99202 OFFICE O/P NEW SF 15 MIN: CPT | Performed by: ACUPUNCTURIST

## 2024-02-14 NOTE — PROGRESS NOTES
Acupuncture Visit:     Subjective   Patient ID: Nahomi Saavedra is a 69 y.o. female who presents for Back Pain and Neck Pain  PRIOR AUTHORIZATION IS REQUIRED  2024 Acupuncture Benefits (Updated)  02/12/2024 RA  Tanner Velasquez #  [id number]                      Limitations (Visits, etc.): None  Covered Diagnosis: low back pain, migraine, cervical (neck) pain, osteoarthritis of knee and/or hip, nausea or vomiting related to pregnancy or chemo; or acute post operative pain.    Low back pain  LEFT neck pain.    States LBP x many years.   She has 4 ruptured discs in low back.  Difficult to sleep due to pain.  She was told to sleep sitting up.  Radiation down L top thigh and hip(GB)  Better heat, biofreeze at times.  She is scheduled for a nerve block to low back 3/24/24.    States has neck pain x 1.5 yrs  Let ear pain and neck pain radiating to LEFT arm.  States she has had this since she got 2 covid shots.    Med hx includes DMII, obesity, high colesterol, HTN              Session Information  Is this acupuncture treatment being billed to the patient's insurance company: Yes  This is visit number: 1  Initial Acupuncture Treatment date: 02/14/24  Name of Insurance Company: [employer] #  [id number]                       Limitations (Visits, etc.): None  Visit Type: New patient         Review of Systems         Provider reviewed plan for the acupuncture session, precautions and contraindications. Patient/guardian/hospital staff has given consent to treat with full understanding of what to expect during the session. Before acupuncture began, provider explained to the patient to communicate at any time if the procedure was causing discomfort past their tolerance level. Patient agreed to advise acupuncturist. The acupuncturist counseled the patient on the risks of acupuncture treatment including pain, infection, bleeding, and no relief of pain. The patient was positioned comfortably. There was no evidence of  infection at the site of needle insertions.    Objective   Physical Exam              Acupuncture Treatment  Body Points - Left: SP 6-4, SP 91.5, 22.06, SI 3, SJ 3, 17, LI 15  Body Points - Bilateral: Lv 3, Kd 6  Other Techniques Utilized: TDP Lamp  TDP Lamp Descripton: feet  Needle Count In: 14  Needle Count Out: 14  Total Face to Face Time (min): 35 minutes              Assessment/Plan

## 2024-02-21 ENCOUNTER — ANESTHESIA (OUTPATIENT)
Dept: GASTROENTEROLOGY | Facility: HOSPITAL | Age: 69
End: 2024-02-21
Payer: COMMERCIAL

## 2024-02-21 ENCOUNTER — ANESTHESIA EVENT (OUTPATIENT)
Dept: GASTROENTEROLOGY | Facility: HOSPITAL | Age: 69
End: 2024-02-21
Payer: COMMERCIAL

## 2024-02-21 ENCOUNTER — HOSPITAL ENCOUNTER (OUTPATIENT)
Dept: GASTROENTEROLOGY | Facility: HOSPITAL | Age: 69
Setting detail: OUTPATIENT SURGERY
Discharge: HOME | End: 2024-02-21
Payer: COMMERCIAL

## 2024-02-21 VITALS
BODY MASS INDEX: 38.88 KG/M2 | HEIGHT: 64 IN | HEART RATE: 73 BPM | SYSTOLIC BLOOD PRESSURE: 150 MMHG | RESPIRATION RATE: 18 BRPM | OXYGEN SATURATION: 97 % | WEIGHT: 227.74 LBS | DIASTOLIC BLOOD PRESSURE: 72 MMHG | TEMPERATURE: 97.5 F

## 2024-02-21 DIAGNOSIS — D12.2 BENIGN NEOPLASM OF ASCENDING COLON: ICD-10-CM

## 2024-02-21 LAB
GLUCOSE BLD MANUAL STRIP-MCNC: 137 MG/DL (ref 74–99)
GLUCOSE BLD MANUAL STRIP-MCNC: 158 MG/DL (ref 74–99)

## 2024-02-21 PROCEDURE — 82947 ASSAY GLUCOSE BLOOD QUANT: CPT

## 2024-02-21 PROCEDURE — 7100000009 HC PHASE TWO TIME - INITIAL BASE CHARGE

## 2024-02-21 PROCEDURE — 45380 COLONOSCOPY AND BIOPSY: CPT | Performed by: INTERNAL MEDICINE

## 2024-02-21 PROCEDURE — 3700000002 HC GENERAL ANESTHESIA TIME - EACH INCREMENTAL 1 MINUTE

## 2024-02-21 PROCEDURE — A45380 PR COLONOSCOPY,BIOPSY: Performed by: NURSE ANESTHETIST, CERTIFIED REGISTERED

## 2024-02-21 PROCEDURE — 88305 TISSUE EXAM BY PATHOLOGIST: CPT | Mod: TC,SUR,AHULAB | Performed by: INTERNAL MEDICINE

## 2024-02-21 PROCEDURE — A45380 PR COLONOSCOPY,BIOPSY: Performed by: ANESTHESIOLOGY

## 2024-02-21 PROCEDURE — 2500000004 HC RX 250 GENERAL PHARMACY W/ HCPCS (ALT 636 FOR OP/ED): Performed by: NURSE ANESTHETIST, CERTIFIED REGISTERED

## 2024-02-21 PROCEDURE — 88305 TISSUE EXAM BY PATHOLOGIST: CPT | Performed by: STUDENT IN AN ORGANIZED HEALTH CARE EDUCATION/TRAINING PROGRAM

## 2024-02-21 PROCEDURE — 7100000010 HC PHASE TWO TIME - EACH INCREMENTAL 1 MINUTE

## 2024-02-21 PROCEDURE — 3700000001 HC GENERAL ANESTHESIA TIME - INITIAL BASE CHARGE

## 2024-02-21 RX ORDER — SODIUM CHLORIDE, SODIUM LACTATE, POTASSIUM CHLORIDE, CALCIUM CHLORIDE 600; 310; 30; 20 MG/100ML; MG/100ML; MG/100ML; MG/100ML
20 INJECTION, SOLUTION INTRAVENOUS CONTINUOUS
Status: DISCONTINUED | OUTPATIENT
Start: 2024-02-21 | End: 2024-02-22 | Stop reason: HOSPADM

## 2024-02-21 RX ORDER — PROPOFOL 10 MG/ML
INJECTION, EMULSION INTRAVENOUS CONTINUOUS PRN
Status: DISCONTINUED | OUTPATIENT
Start: 2024-02-21 | End: 2024-02-21

## 2024-02-21 RX ORDER — MIDAZOLAM HYDROCHLORIDE 1 MG/ML
INJECTION INTRAMUSCULAR; INTRAVENOUS AS NEEDED
Status: DISCONTINUED | OUTPATIENT
Start: 2024-02-21 | End: 2024-02-21

## 2024-02-21 RX ORDER — PROPOFOL 10 MG/ML
INJECTION, EMULSION INTRAVENOUS AS NEEDED
Status: DISCONTINUED | OUTPATIENT
Start: 2024-02-21 | End: 2024-02-21

## 2024-02-21 RX ADMIN — SODIUM CHLORIDE, POTASSIUM CHLORIDE, SODIUM LACTATE AND CALCIUM CHLORIDE: 600; 310; 30; 20 INJECTION, SOLUTION INTRAVENOUS at 12:15

## 2024-02-21 RX ADMIN — PROPOFOL 200 MCG/KG/MIN: 10 INJECTION, EMULSION INTRAVENOUS at 12:26

## 2024-02-21 RX ADMIN — PROPOFOL 50 MG: 10 INJECTION, EMULSION INTRAVENOUS at 12:24

## 2024-02-21 RX ADMIN — PROPOFOL 200 MCG/KG/MIN: 10 INJECTION, EMULSION INTRAVENOUS at 12:24

## 2024-02-21 RX ADMIN — MIDAZOLAM HYDROCHLORIDE 2 MG: 1 INJECTION INTRAMUSCULAR; INTRAVENOUS at 12:24

## 2024-02-21 ASSESSMENT — PAIN SCALES - GENERAL
PAINLEVEL_OUTOF10: 0 - NO PAIN
PAINLEVEL_OUTOF10: 5 - MODERATE PAIN
PAINLEVEL_OUTOF10: 0 - NO PAIN

## 2024-02-21 ASSESSMENT — COLUMBIA-SUICIDE SEVERITY RATING SCALE - C-SSRS
1. IN THE PAST MONTH, HAVE YOU WISHED YOU WERE DEAD OR WISHED YOU COULD GO TO SLEEP AND NOT WAKE UP?: NO
6. HAVE YOU EVER DONE ANYTHING, STARTED TO DO ANYTHING, OR PREPARED TO DO ANYTHING TO END YOUR LIFE?: NO
2. HAVE YOU ACTUALLY HAD ANY THOUGHTS OF KILLING YOURSELF?: NO

## 2024-02-21 ASSESSMENT — PAIN - FUNCTIONAL ASSESSMENT
PAIN_FUNCTIONAL_ASSESSMENT: 0-10
PAIN_FUNCTIONAL_ASSESSMENT: 0-10

## 2024-02-21 NOTE — H&P
"History Of Present Illness  Nahomi Saavedra is a 69 y.o. female presenting with h/o colon polyps.    Prior colonoscopy with EMR 45 mm ascending polyp and 15 mm transverse polyp.  Notes worsening constipation.      Here for surveillance.     Past Medical History  Past Medical History:   Diagnosis Date    Anxiety     Bipolar disorder, unspecified (CMS/HCC)     Chronic obstructive pulmonary disease, unspecified (CMS/HCC)     History of DVT (deep vein thrombosis) 2019    left leg    History of hepatitis C     treated 2018    HTN (hypertension)     Low back pain, unspecified     Polyneuropathy, unspecified     Type 2 diabetes mellitus without complications (CMS/HCC)        Surgical History  Past Surgical History:   Procedure Laterality Date    BUNIONECTOMY      COLONOSCOPY      HERNIA REPAIR      KNEE ARTHROSCOPY W/ DEBRIDEMENT Right 2019    SHOULDER ARTHROSCOPY      TUBAL LIGATION      US GUIDED THYROID BIOPSY  03/07/2022    US GUIDED THYROID BIOPSY 3/7/2022        Social History  She reports that she has been smoking cigarettes. She has a 14.00 pack-year smoking history. She has never used smokeless tobacco. She reports current alcohol use. She reports that she does not currently use drugs.    Family History  Family History   Problem Relation Name Age of Onset    Depression Mother      Diabetes Mother      Other (essential hypertension) Mother      Cancer Mother          Allergies  Peg 3350-sod sulf,chlr-pot-mag; Ace inhibitors; Albiglutide; Cromolyn; Hydrocodone-homatropine; Insulin aspart; Insulin lispro; Insulin nph human isophane; Oxybutynin; Penicillins; Sulfa (sulfonamide antibiotics); and Ziprasidone    Review of Systems     Physical Exam     Last Recorded Vitals  Blood pressure 143/56, pulse 77, temperature 36.1 °C (97 °F), resp. rate 15, height 1.626 m (5' 4\"), weight 103 kg (227 lb 11.8 oz), SpO2 97 %.    Relevant Results       Assessment/Plan   Proceed with colonoscopy.        I spent 10 minutes in the " professional and overall care of this patient.      Keyana Guerrero MD

## 2024-02-21 NOTE — ANESTHESIA POSTPROCEDURE EVALUATION
Patient: Nahomi Saavedra    Procedure Summary       Date: 02/21/24 Room / Location: [facility]    Anesthesia Start: 1223 Anesthesia Stop: 1258    Procedure: COLONOSCOPY Diagnosis: Benign neoplasm of ascending colon    Scheduled Providers: Keyana Guerrero MD; Michele Dumont MD; TEDDY De La Cruz; Tootie Chisholm, MORGAN; Madie Cohen, RN; Rachel Kellogg RN Responsible Provider: Michele Dumont MD    Anesthesia Type: MAC ASA Status: 3            Anesthesia Type: MAC    Vitals Value Taken Time   /72 02/21/24 1336   Temp 36.4 °C (97.5 °F) 02/21/24 1251   Pulse 73 02/21/24 1336   Resp 18 02/21/24 1336   SpO2 97 % 02/21/24 1336       Anesthesia Post Evaluation    Patient location during evaluation: PACU  Patient participation: complete - patient participated  Level of consciousness: awake and alert  Pain management: adequate  Airway patency: patent  Cardiovascular status: acceptable and hemodynamically stable  Respiratory status: acceptable, spontaneous ventilation and nonlabored ventilation  Hydration status: acceptable  Postoperative Nausea and Vomiting: none        There were no known notable events for this encounter.

## 2024-02-21 NOTE — POST-PROCEDURE NOTE
1251 Received patient from the procedure accompanied by RN and AA. Patient sleepy, easily arousable. Removed O2 via facemask, placed on room air.    1302 Dr. Guerrero at bedside and talked to patient    1319 Patient tolerating ginger ale and sandwich.    1347 Homegoing instructions reviewed with patient and  and they verbalized understanding, copies furnished.    1356 IV removed without problems.     1401 Discharged in satisfactory condition via wheelchair.

## 2024-02-21 NOTE — DISCHARGE INSTRUCTIONS

## 2024-02-21 NOTE — ANESTHESIA PREPROCEDURE EVALUATION
Patient: Nahomi Saavedra    Procedure Information       Date/Time: 02/21/24 1100    Scheduled providers: Keyana Guerrero MD; Michele Dumont MD; TEDDY De La Cruz; Tootie Chisholm, MORGAN; Madie Cohen, RN; Rachel Kellogg, RN    Procedure: COLONOSCOPY    Location: [facility]            Relevant Problems   Endocrine   (+) Obesity   (+) Type 2 diabetes mellitus (CMS/HCC)      Neuro/Psych   (+) Anxiety   (+) Bipolar disorder (CMS/HCC)   (+) Peripheral polyneuropathy      Pulmonary   (+) Asthma   (+) Chronic obstructive lung disease (CMS/HCC)      Hematology   (+) Chronic anticoagulation      Musculoskeletal   (+) Chronic lower back pain   (+) Lumbar spondylosis       Clinical information reviewed:   Tobacco  Allergies  Meds   Med Hx  Surg Hx   Fam Hx  Soc Hx        NPO/Void Status  Carbohydrate Drink Given Prior to Surgery? : N  Date of Last Liquid: 02/21/24  Time of Last Liquid: 0800  Date of Last Solid: 02/20/24  Time of Last Solid: 1730  Last Intake Type: Clear fluids  Time of Last Void: 0900           Past Medical History:   Diagnosis Date    Anxiety     Bipolar disorder, unspecified (CMS/HCC)     Chronic obstructive pulmonary disease, unspecified (CMS/HCC)     History of DVT (deep vein thrombosis) 2019    left leg    History of hepatitis C     treated 2018    HTN (hypertension)     Low back pain, unspecified     Polyneuropathy, unspecified     Type 2 diabetes mellitus without complications (CMS/HCC)       Past Surgical History:   Procedure Laterality Date    BUNIONECTOMY      COLONOSCOPY      HERNIA REPAIR      KNEE ARTHROSCOPY W/ DEBRIDEMENT Right 2019    SHOULDER ARTHROSCOPY      TUBAL LIGATION      US GUIDED THYROID BIOPSY  03/07/2022    US GUIDED THYROID BIOPSY 3/7/2022     Social History     Tobacco Use    Smoking status: Every Day     Packs/day: 0.50     Years: 28.00     Additional pack years: 0.00     Total pack years: 14.00     Types: Cigarettes    Smokeless tobacco: Never   Vaping  "Use    Vaping Use: Never used   Substance Use Topics    Alcohol use: Yes    Drug use: Not Currently      Current Outpatient Medications   Medication Instructions    albuterol (Ventolin HFA) 90 mcg/actuation inhaler inhalation, As needed, 1-2puffs q 4-6hrs     albuterol 2.5 mg /3 mL (0.083 %) nebulizer solution 1 ampule, nebulization, Every 6 hours PRN    Allergy, diphenhydrAMINE, 25 mg capsule oral, Allergy, diphenhydrAMINE, 25 mg capsule    amLODIPine (NORVASC) 10 mg, oral, Daily    BD Ultra-Fine Micro Pen Needle 32 gauge x 1/4\" needle Daily, BD Ultra-Fine Micro Pen Needle 32 gauge x 1/4\" needle    carboxymethylcellulose (Refresh Plus) 0.5 % ophthalmic solution 1 drop, Both Eyes, As needed    diazePAM (VALIUM) 5 mg, oral, Every 8 hours PRN, Max daily dose of 3    HumaLOG Mix 75-25 KwikPen 100 unit/mL (75-25) injection subcutaneous, 2 times daily with meals, 26u q AM and 16u at night before meals    hydrALAZINE (APRESOLINE) 25 mg, oral, Every 6 hours, For hypertension    hydroCHLOROthiazide (HYDRODiuril) 25 mg tablet 1 tablet, oral, Daily    ibuprofen (IBU) 600 mg, oral, Daily PRN    lancets (OneTouch Delica Plus Lancet) 30 gauge misc miscellaneous    Levemir FlexTouch U100 Insulin 8 Units, subcutaneous, Nightly, Just recently changed to 8 units at night     lidocaine (Lidoderm) 5 % patch transdermal, lidocaine (Lidoderm) 5 % patch    OneTouch Verio test strips strip 4 times daily    oxyCODONE-acetaminophen (Percocet)  mg tablet oral    oxyCODONE-acetaminophen (Percocet) 5-325 mg tablet oral, oxyCODONE-acetaminophen (Percocet) 5-325 mg tablet    sertraline (Zoloft) 50 mg tablet oral, sertraline (Zoloft) 50 mg tablet    triamcinolone (Kenalog) 0.5 % cream Topical, triamcinolone (Kenalog) 0.5 % cream    Xarelto 2.5 mg tablet 1 tablet, oral, Daily      Allergies   Allergen Reactions    Peg 3350-Sod Sulf,Chlr-Pot-Mag Hives    Ace Inhibitors Unknown    Albiglutide Other    Cromolyn Unknown    " "Hydrocodone-Homatropine Unknown    Insulin Aspart Unknown    Insulin Lispro Itching    Insulin Nph Human Isophane Unknown    Oxybutynin Unknown    Penicillins Hives, Swelling and Unknown    Sulfa (Sulfonamide Antibiotics) Hives, Swelling and Unknown    Ziprasidone Unknown        Chemistry    Lab Results   Component Value Date/Time     12/11/2023 0338    K 3.6 12/11/2023 0338     12/11/2023 0338    CO2 28 12/11/2023 0338    BUN 11 12/11/2023 0338    CREATININE 0.60 12/11/2023 0338    Lab Results   Component Value Date/Time    CALCIUM 8.5 (L) 12/11/2023 0338    ALKPHOS 87 12/09/2023 1756    AST 16 12/09/2023 1756    ALT 8 12/09/2023 1756    BILITOT 0.6 12/09/2023 1756          Lab Results   Component Value Date/Time    WBC 6.7 12/11/2023 0338    HGB 11.7 (L) 12/11/2023 0338    HCT 34.7 (L) 12/11/2023 0338     12/11/2023 0338     Lab Results   Component Value Date/Time    PROTIME 17.4 (H) 09/28/2022 1823    INR 1.5 (H) 09/28/2022 1823     No results found for this or any previous visit (from the past 4464 hour(s)).  No results found for this or any previous visit from the past 1095 days.       Visit Vitals  /56   Pulse 77   Temp 36.1 °C (97 °F)   Resp 15   Ht 1.626 m (5' 4\")   Wt 103 kg (227 lb 11.8 oz)   SpO2 97%   BMI 39.09 kg/m²   Smoking Status Every Day   BSA 2.16 m²        Physical Exam    Airway  Mallampati: III  TM distance: >3 FB  Neck ROM: full     Cardiovascular   Rhythm: regular  Rate: normal     Dental - normal exam     Pulmonary   Breath sounds clear to auscultation     Abdominal - normal exam              Anesthesia Plan    History of general anesthesia?: yes  History of complications of general anesthesia?: no    ASA 3     MAC     intravenous induction   Anesthetic plan and risks discussed with patient.    Plan discussed with CRNA and CAA.        "

## 2024-02-22 ASSESSMENT — PAIN SCALES - GENERAL: PAINLEVEL_OUTOF10: 0 - NO PAIN

## 2024-02-26 LAB
LABORATORY COMMENT REPORT: NORMAL
PATH REPORT.FINAL DX SPEC: NORMAL
PATH REPORT.GROSS SPEC: NORMAL
PATH REPORT.TOTAL CANCER: NORMAL
RESIDENT REVIEW: NORMAL

## 2024-03-04 ENCOUNTER — OFFICE VISIT (OUTPATIENT)
Dept: ENDOCRINOLOGY | Facility: CLINIC | Age: 69
End: 2024-03-04
Payer: COMMERCIAL

## 2024-03-04 VITALS
BODY MASS INDEX: 37.56 KG/M2 | HEIGHT: 64 IN | HEART RATE: 73 BPM | WEIGHT: 220 LBS | SYSTOLIC BLOOD PRESSURE: 130 MMHG | DIASTOLIC BLOOD PRESSURE: 80 MMHG

## 2024-03-04 DIAGNOSIS — Z79.4 TYPE 2 DIABETES MELLITUS WITH HYPERGLYCEMIA, WITH LONG-TERM CURRENT USE OF INSULIN (MULTI): Primary | ICD-10-CM

## 2024-03-04 DIAGNOSIS — E66.9 OBESITY (BMI 30-39.9): ICD-10-CM

## 2024-03-04 DIAGNOSIS — E11.65 TYPE 2 DIABETES MELLITUS WITH HYPERGLYCEMIA, WITH LONG-TERM CURRENT USE OF INSULIN (MULTI): Primary | ICD-10-CM

## 2024-03-04 LAB — POC HEMOGLOBIN A1C: 7.9 % (ref 4.2–6.5)

## 2024-03-04 PROCEDURE — 3008F BODY MASS INDEX DOCD: CPT | Performed by: NURSE PRACTITIONER

## 2024-03-04 PROCEDURE — 1159F MED LIST DOCD IN RCRD: CPT | Performed by: NURSE PRACTITIONER

## 2024-03-04 PROCEDURE — 3079F DIAST BP 80-89 MM HG: CPT | Performed by: NURSE PRACTITIONER

## 2024-03-04 PROCEDURE — 99204 OFFICE O/P NEW MOD 45 MIN: CPT | Performed by: NURSE PRACTITIONER

## 2024-03-04 PROCEDURE — 1160F RVW MEDS BY RX/DR IN RCRD: CPT | Performed by: NURSE PRACTITIONER

## 2024-03-04 PROCEDURE — 1125F AMNT PAIN NOTED PAIN PRSNT: CPT | Performed by: NURSE PRACTITIONER

## 2024-03-04 PROCEDURE — 83036 HEMOGLOBIN GLYCOSYLATED A1C: CPT | Performed by: NURSE PRACTITIONER

## 2024-03-04 PROCEDURE — 3075F SYST BP GE 130 - 139MM HG: CPT | Performed by: NURSE PRACTITIONER

## 2024-03-04 RX ORDER — INSULIN LISPRO 100 [IU]/ML
INJECTION, SUSPENSION SUBCUTANEOUS
Qty: 60 ML | Refills: 1 | Status: SHIPPED | OUTPATIENT
Start: 2024-03-04

## 2024-03-04 NOTE — PROGRESS NOTES
"Subjective   Patient is sent as a self referral for my opinion regarding Type 2 diabetes.  My final recommendations will be communicated back to the requesting provider by way of shared medical record.     Nahomi Saavedra is a 69 y.o. female here today for a new patient visit regarding DM Type 2. Initial diagnosis with diabetes was over 13 years (she reports).  The patient has a family history in her sisters, brothers, and mother.    Known complications include: hyperlipidemia, obesity   History of diverticulitis     Previously seeing PCP for diabetes care.    A1c 7.9% today.  She reports previous A1c 8.2%.    Here today to establish care  She has a lot of allergies to insulin and sensitivities to medication in general   However she is unsure what insulin caused what  Appears she may have had hives/skin reactions to different insulins  Currently she is taking Levemir and Humalog 75/25.    Levemir was added due to overnight highs  She had to lower the Levemir due to lows   She feels Levemir keeps her up at night   She lives alone     She if very fearful of starting new medications  In 2005 she was started on antidepressant.  She had a reaction and she tried to commit suicide  She then ended up in care home for 7 years   She adds to her fear of trying new medications      She was recently admitted for UTI with sepsis   Did not know she even had a UTI   She tried a  new antibiotic and reported skin reaction/hives while in hospital  Has history of UTI and yeast infections but she did not feel this one at all.    She was puzzled by this.      She wakes up around [id number]   1st meal 1130-12pm   Eats two meals per day   Will snack on beets, celery and PB, carrots   Likes 1/2 PB on bread for evening snack   Cut out pop but does drink diluted juices      Historical meds:  NPH - unclear   Lispro  - unclear  Aspart - unclear   Metformin - stomach cramping   Once weekly injection- \"sent me to the hospital\"  tried another  " "      Current diabetes regimen is as follows:   Humalog mix 75/25 16 units in the morning, 16 units in the evening   Levemir 10 units at bedtime - she is taking 7 units      Patient did not like using continuous glucose monitor - has skin reactions   Does not want to try again      The patient is currently checking the blood glucose 3-4 times per day   Did not bring meter or logbook today     She reports:   Fasting sugars: 120-130s   1130 am: 200s  Pre dinner: 200s      Hypoglycemia frequency: Denies since lowering her Levemir   Hypoglycemia awareness: yes     The patient comes into the office today with hyperglycemia.  She does not want to wear CGM or try  new medications.        ROS   General: no fever, chills or acute changes in weight in the last 6 months  Skin: no rashes, pruritis or dry skin  Cardiac: denies chest pain, heart palpitations or orthopnea  Pulmonary: denies wheezing, productive cough or exertional dyspnea      Objective    Physical Exam  Blood pressure 130/80, pulse 73, height 1.626 m (5' 4\"), weight 99.8 kg (220 lb).  General: not in acute distress, cooperative   Respiratory: normal respiratory effort  Musculoskeletal: normal gait       Current Outpatient Medications:     albuterol (Ventolin HFA) 90 mcg/actuation inhaler, Inhale if needed for wheezing or shortness of breath. 1-2puffs q 4-6hrs, Disp: , Rfl:     albuterol 2.5 mg /3 mL (0.083 %) nebulizer solution, Take 1 ampule by nebulization every 6 hours if needed for wheezing or shortness of breath., Disp: , Rfl:     Allergy, diphenhydrAMINE, 25 mg capsule, Take by mouth. Allergy, diphenhydrAMINE, 25 mg capsule, Disp: , Rfl:     amLODIPine (Norvasc) 10 mg tablet, Take 1 tablet (10 mg) by mouth once daily., Disp: , Rfl:     BD Ultra-Fine Micro Pen Needle 32 gauge x 1/4\" needle, once daily. BD Ultra-Fine Micro Pen Needle 32 gauge x 1/4\" needle, Disp: , Rfl:     carboxymethylcellulose (Refresh Plus) 0.5 % ophthalmic solution, Administer 1 drop " into both eyes if needed for dry eyes., Disp: , Rfl:     diazePAM (Valium) 5 mg tablet, Take 1 tablet (5 mg) by mouth every 8 hours if needed for anxiety. Max daily dose of 3, Disp: , Rfl:     HumaLOG Mix 75-25 KwikPen 100 unit/mL (75-25) injection, Inject under the skin 2 times a day with meals. 26u q AM and 16u at night before meals, Disp: , Rfl:     hydrALAZINE (Apresoline) 25 mg tablet, Take 1 tablet (25 mg) by mouth every 6 hours. For hypertension, Disp: , Rfl:     hydroCHLOROthiazide (HYDRODiuril) 25 mg tablet, Take 1 tablet (25 mg) by mouth once daily., Disp: , Rfl:     ibuprofen (IBU) 600 mg tablet, Take 1 tablet (600 mg) by mouth once daily as needed (pain)., Disp: , Rfl:     lancets (OneTouch Delica Plus Lancet) 30 gauge misc, , Disp: , Rfl:     Levemir FlexTouch U100 Insulin 100 unit/mL (3 mL) pen, Inject 8 Units under the skin once daily at bedtime. Just recently changed to 8 units at night, Disp: , Rfl:     lidocaine (Lidoderm) 5 % patch, Place on the skin. lidocaine (Lidoderm) 5 % patch, Disp: , Rfl:     OneTouch Verio test strips strip, 4 times a day., Disp: , Rfl:     oxyCODONE-acetaminophen (Percocet)  mg tablet, Take by mouth., Disp: , Rfl:     oxyCODONE-acetaminophen (Percocet) 5-325 mg tablet, Take by mouth. oxyCODONE-acetaminophen (Percocet) 5-325 mg tablet, Disp: , Rfl:     sertraline (Zoloft) 50 mg tablet, Take by mouth. sertraline (Zoloft) 50 mg tablet, Disp: , Rfl:     triamcinolone (Kenalog) 0.5 % cream, Apply topically. triamcinolone (Kenalog) 0.5 % cream, Disp: , Rfl:     Xarelto 2.5 mg tablet, Take 1 tablet (2.5 mg) by mouth once daily., Disp: , Rfl:     Assessment/Plan   Type 2 diabetes with hyperglycemia with long-term use of insulin:  Obesity:  -A1c improved from previous reported A1c but not at goal.  She is frustrated by this.  She feels she is made a lot of diet changes and does not eat the foods she used to.  She does report taking her insulin 15 minutes before she eats and  recommended to continue this practice.  Discussed the insulin she is taking and would recommend stopping Levemir.  This is going to be discontinued anyway.  Discussed if she does not want to try new medications we can adjust her insulin.  Would not recommend SGLT2 at this time due to yeast infections and urinary tract infections.  Reports not able to tolerate metformin or GLP-1.  Would avoid GUERIN due to risk of hypoglycemia and weight gain.  Given her fear of trying new medications and having sensitivities to medications, recommend continuing the Humalog 75/25 that we know she tolerates and adjust the dose.  She would like to start slow with the adjustments.  She is adamant about not using CGM and will continue to check blood sugars.  Recommended she bring her logbook or meter to every appointment.  Short-term follow-up with Pharm.D. for additional adjustments.  Also recommend follow-up with dietitian to help with meal plan in regards to diabetes and diverticulitis.  She would also like to lose weight..    Plan:   Stop Levemir   75/25 18 units in the morning and 20 units with dinner   Look at your blood sugars every 3-4 days.  If waking up greater than 180, increase the dinner dose by 2 units.   If you dinner reading is greater than 180, you will increase your morning dose 2 units.    Bring your meter or bring your book to my appt and our pharmacist appt   Schedule with Pharmacist    Schedule with dietician   Notify me sooner if continue to have highs > 180 or low blood sugars less than 80  Follow up with me in 3 months virtually, 6 months in person

## 2024-03-04 NOTE — PATIENT INSTRUCTIONS
Stop Levemir     75/25 18 units in the morning and 20 units with dinner   Look at your blood sugars every 3-4 days.  If waking up greater than 180, increase the dinner dose by 2 units.   If you dinner reading is greater than 180, you will increase your morning dose 2 units.      Bring your meter or bring your book to my appt and our pharmacist appt     Schedule with Pharmacist      Schedule with dietician     Follow up with me in 3 months virtually, 6 months in person

## 2024-03-05 ENCOUNTER — ALLIED HEALTH (OUTPATIENT)
Dept: INTEGRATIVE MEDICINE | Facility: CLINIC | Age: 69
End: 2024-03-05
Payer: COMMERCIAL

## 2024-03-05 DIAGNOSIS — M54.2 CERVICALGIA: ICD-10-CM

## 2024-03-05 DIAGNOSIS — M25.512 CHRONIC LEFT SHOULDER PAIN: ICD-10-CM

## 2024-03-05 DIAGNOSIS — M54.59 OTHER LOW BACK PAIN: Primary | ICD-10-CM

## 2024-03-05 DIAGNOSIS — G89.29 CHRONIC LEFT SHOULDER PAIN: ICD-10-CM

## 2024-03-05 PROCEDURE — 97811 ACUP 1/> W/O ESTIM EA ADD 15: CPT | Performed by: ACUPUNCTURIST

## 2024-03-05 PROCEDURE — 97810 ACUP 1/> WO ESTIM 1ST 15 MIN: CPT | Performed by: ACUPUNCTURIST

## 2024-03-05 NOTE — PROGRESS NOTES
Acupuncture Visit:     Subjective   Patient ID: Nahomi Saavedra is a 69 y.o. female who presents for Back Pain, Neck Pain, and Shoulder Pain  PRIOR AUTHORIZATION IS REQUIRED  2024 Acupuncture Benefits (Updated)  02/12/2024 RA  [facility] #  [id number]                      Limitations (Visits, etc.): None  Covered Diagnosis: low back pain, migraine, cervical (neck) pain, osteoarthritis of knee and/or hip, nausea or vomiting related to pregnancy or chemo; or acute post operative pain.    03/05/24  Chronic Low Back Pain: Her chronic lower back pain has been very sore this past week or so.  Today her pain is 8/10.  She is having difficulty with walking both short and long distance.  Pain is located in the lower back and travels into the left buttocks, hip and thigh.  She reports pain in the entire thigh and not a specific area.  Acupuncture has been very helpful several years ago.  She is scheduled for a nerve block on Thursday - this also worked well for her about 7 years ago.     Chronic (L) neck and shoulder pain:  she has constant pain in the left neck and shoulder.  Pain is in the left neck and trap and into the left posterior shoulder. She takes cortisone injections 3 times per year and needs daily pain medication.        Initial intake with Sanaz iversityrocco on 02/14/24  Low back pain  LEFT neck pain.    States LBP x many years.   She has 4 ruptured discs in low back.  Difficult to sleep due to pain.  She was told to sleep sitting up.  Radiation down L top thigh and hip(GB)  Better heat, biofreeze at times.  She is scheduled for a nerve block to low back 3/24/24.    States has neck pain x 1.5 yrs  Let ear pain and neck pain radiating to LEFT arm.  States she has had this since she got 2 covid shots.    Med hx includes DMII, obesity, high colesterol, HTN              Session Information  Is this acupuncture treatment being billed to the patient's insurance company: Yes  This is visit number: 2  Initial  Acupuncture Treatment date: 02/14/24  Last Treatment date: 02/14/24  Name of Insurance Company: [employer]  Visit Type: Follow-up visit  Medical History Reviewed: I have reviewed pertinent medical history in EHR, and no contraindications are present to provide treatment         Review of Systems         Provider reviewed plan for the acupuncture session, precautions and contraindications. Patient/guardian/hospital staff has given consent to treat with full understanding of what to expect during the session. Before acupuncture began, provider explained to the patient to communicate at any time if the procedure was causing discomfort past their tolerance level. Patient agreed to advise acupuncturist. The acupuncturist counseled the patient on the risks of acupuncture treatment including pain, infection, bleeding, and no relief of pain. The patient was positioned comfortably. There was no evidence of infection at the site of needle insertions.    Objective   Physical Exam              Acupuncture Treatment  Patient Position: Lateral recumbent on a table (lying on right side)  Acupuncture Needling: Yes  Needle Guage: 36 guage /.20/ Blue seirin  Body Points: With retention  Body Points - Left: tunzhong, GB 29, ST 36, GB 34, SI 10, GB 21, SJ 15, LI 11, Du 20  Body Points - Bilateral: UB 23, yaoyan  Auricular Points: Yes  Auricular Points - Left: zero, shenmen  Electroacupuncture Used: No  Other Techniques Utilized: TDP Lamp  TDP Lamp Descripton: feet and lower back  Needle Count In: 15  Needle Count Out: 15  Needle Retention Time (min): 25 minutes  Total Face to Face Time (min): 25 minutes              Assessment/Plan   Nahomi was seen today for back pain, neck pain and shoulder pain.  Diagnoses and all orders for this visit:  Other low back pain (Primary)  Cervicalgia  Chronic left shoulder pain  .

## 2024-03-11 ENCOUNTER — TELEPHONE (OUTPATIENT)
Dept: ENDOCRINOLOGY | Facility: CLINIC | Age: 69
End: 2024-03-11
Payer: COMMERCIAL

## 2024-03-11 NOTE — TELEPHONE ENCOUNTER
Patient called to state blood sugars are high 210 am reading this morning, states she has been increasing by 2 units

## 2024-03-11 NOTE — TELEPHONE ENCOUNTER
Spoke with patient. She is now taking 75/25 insulin: 20 Units AM and 22 PM.     States her fasting this AM was 210 mg/dl   Instructed to increase to continue 20 units AM and 24 nightly. She was instructed to increase by 2 units in 3 days if she is still running above 120 mg/dl

## 2024-03-12 ENCOUNTER — ALLIED HEALTH (OUTPATIENT)
Dept: INTEGRATIVE MEDICINE | Facility: CLINIC | Age: 69
End: 2024-03-12
Payer: COMMERCIAL

## 2024-03-12 DIAGNOSIS — M54.59 OTHER LOW BACK PAIN: Primary | ICD-10-CM

## 2024-03-12 DIAGNOSIS — M54.2 CERVICALGIA: ICD-10-CM

## 2024-03-12 PROCEDURE — 97810 ACUP 1/> WO ESTIM 1ST 15 MIN: CPT | Performed by: ACUPUNCTURIST

## 2024-03-12 PROCEDURE — 97811 ACUP 1/> W/O ESTIM EA ADD 15: CPT | Performed by: ACUPUNCTURIST

## 2024-03-12 NOTE — PROGRESS NOTES
Acupuncture Visit:     Subjective   Patient ID: Nahomi Saavedra is a 69 y.o. female who presents for Back Pain and Neck Pain  PRIOR AUTHORIZATION IS REQUIRED  2024 Acupuncture Benefits (Updated)  02/12/2024 RA  [facility]                  Limitations : None  Covered Diagnosis: low back pain, migraine, cervical (neck) pain, osteoarthritis of knee and/or hip, nausea or vomiting related to pregnancy or chemo; or acute post operative pain.  Tx 3    States she felt great after the initial tx.  Notes she was worse p the 2nd tx.  L hip and L shoulder (states torn rotator cuff) with pain  L ear with fullness    Previous : 03/05/24  Chronic Low Back Pain: Her chronic lower back pain has been very sore this past week or so.  Today her pain is 8/10.  She is having difficulty with walking both short and long distance.  Pain is located in the lower back and travels into the left buttocks, hip and thigh.  She reports pain in the entire thigh and not a specific area.  Acupuncture has been very helpful several years ago.  She is scheduled for a nerve block on Thursday - this also worked well for her about 7 years ago.     Chronic (L) neck and shoulder pain:  she has constant pain in the left neck and shoulder.  Pain is in the left neck and trap and into the left posterior shoulder. She takes cortisone injections 3 times per year and needs daily pain medication.      Initial intake with Sanaz Sbrocco on 02/14/24  Low back pain  LEFT neck pain.    States LBP x many years.   She has 4 ruptured discs in low back.  Difficult to sleep due to pain.  She was told to sleep sitting up.  Radiation down L top thigh and hip(GB)  Better heat, biofreeze at times.  She is scheduled for a nerve block to low back 3/24/24.    States has neck pain x 1.5 yrs  Let ear pain and neck pain radiating to LEFT arm.  States she has had this since she got 2 covid shots.    Med hx includes DMII, obesity, high colesterol, HTN              Session Information  Is  this acupuncture treatment being billed to the patient's insurance company: Yes  This is visit number: 3  Initial Acupuncture Treatment date: 02/14/24  Name of Insurance Company: [employer] : NO limitations         Review of Systems         Provider reviewed plan for the acupuncture session, precautions and contraindications. Patient/guardian/hospital staff has given consent to treat with full understanding of what to expect during the session. Before acupuncture began, provider explained to the patient to communicate at any time if the procedure was causing discomfort past their tolerance level. Patient agreed to advise acupuncturist. The acupuncturist counseled the patient on the risks of acupuncture treatment including pain, infection, bleeding, and no relief of pain. The patient was positioned comfortably. There was no evidence of infection at the site of needle insertions.    Objective   Physical Exam              Acupuncture Treatment  Body Points - Left: SP 6-4, SP 91.5, 22.06, SI 3, SJ 3, 15-17 , GB 29, 31, 39, 41  Body Points - Bilateral: Du 4, Lv 3, Kd 6, UB 23  Other Techniques Utilized: TDP Lamp  TDP Lamp Descripton: NO HEAT  Needle Count In: 22  Needle Count Out: 22  Total Face to Face Time (min): 35 minutes              Assessment/Plan   Nahomi was seen today for back pain and neck pain.  Diagnoses and all orders for this visit:  Other low back pain (Primary)  Cervicalgia  .

## 2024-03-14 ENCOUNTER — TELEMEDICINE CLINICAL SUPPORT (OUTPATIENT)
Dept: ENDOCRINOLOGY | Facility: CLINIC | Age: 69
End: 2024-03-14
Payer: COMMERCIAL

## 2024-03-14 VITALS — BODY MASS INDEX: 37.56 KG/M2 | WEIGHT: 220 LBS | HEIGHT: 64 IN

## 2024-03-14 DIAGNOSIS — Z79.4 TYPE 2 DIABETES MELLITUS WITH HYPERGLYCEMIA, WITH LONG-TERM CURRENT USE OF INSULIN (MULTI): ICD-10-CM

## 2024-03-14 DIAGNOSIS — E11.65 TYPE 2 DIABETES MELLITUS WITH HYPERGLYCEMIA, WITH LONG-TERM CURRENT USE OF INSULIN (MULTI): ICD-10-CM

## 2024-03-14 DIAGNOSIS — Z71.3 DIETARY COUNSELING: ICD-10-CM

## 2024-03-14 PROCEDURE — 97802 MEDICAL NUTRITION INDIV IN: CPT | Performed by: DIETITIAN, REGISTERED

## 2024-03-14 NOTE — PROGRESS NOTES
"Initial Nutrition Assessment    Patient was referred to nutrition by GARRETT Chopra for education on healthy eating for consideration of Type 2 DM. Also considered is Obesity status with pt reported goals of continued weight loss. Has lost 30 lbs intentionally over the past year and wants to work on further weight loss. Other PMHX significant for HLD, Chronic Obstructive Lung Disease, Bipolar, sleep apnea, Asthma and Diverticulitis. Last A1c noted of 7.9%. Taking 75/25 insulin and titration of dose every 3-4 days as needed to get BG readings to goal.    Diet recall reveals a meal pattern consistent of two meals and 1-2 daily snacks due to sleeping in late. Pt with good attention to smaller overall portions over the past year, as well as implementation of a wider variety of healthier foods and elimination most often of processed foods. Pt however not aware of all CHO containing foods and portions recommended to have at meals and would likely benefit to incorporate better consistency with CHO intake to assist in achieving goals with BG control related to meals. Fluids meeting recommendations in type and amount with water as primary beverage. See all interventions/recommendations below as discussed during visit this day.     Patient reported symptoms: None    Anthropometrics:  Height:   Ht Readings from Last 1 Encounters:   03/04/24 1.626 m (5' 4\")      Weight:   Wt Readings from Last 10 Encounters:   03/04/24 99.8 kg (220 lb)   02/21/24 103 kg (227 lb 11.8 oz)   12/09/23 104 kg (228 lb 6.3 oz)   12/07/23 104 kg (230 lb)   12/06/23 104 kg (230 lb)   11/06/23 105 kg (231 lb 7.7 oz)   10/17/23 110 kg (243 lb)   08/07/23 102 kg (225 lb)   06/12/23 107 kg (236 lb)   04/04/23 105 kg (232 lb)      Current BMI:   BMI Readings from Last 1 Encounters:   03/04/24 37.76 kg/m²        Labs:  Lab Results   Component Value Date    HGBA1C 7.9 (A) 03/04/2024      Lab Results   Component Value Date    CHOL 217 (H) 03/02/2020 " "    Lab Results   Component Value Date    HDL 47.9 03/02/2020     No results found for: \"LDLCALC\"  Lab Results   Component Value Date    TRIG 152 (H) 03/02/2020       Malnutrition Screening:  Significant Unintentional weight loss: No  Eating less than 75% of usual intake for more than 2 weeks: No  Potential Signs of Inflammation: No    Recommended Malnutrition Diagnosis: No malnutrition identified    Diet Recall-  Breakfast (11 AM)- whole grain toast with PB and applesauce OR PB sandwich alone OR grits, eggs and mckeon   Snack mid day- celery and carrots with PB OR potato chips occasionally (snack size bag) OR kiwi fruit  Dinner (5:30-6 PM)- broiled vegetables, smaller portions of starches with turkey, chicken OR fish   Snack in evening- 1/2 grilled cheese sandwich OR serving of grapes OR occasional home made oatmeal cookie   Beverages- coffee in am, water throughout the day  Alcohol- special occasions only     Other pertinent patient reported Information:  - Has lost 30 lbs over the past year intentionally with trying to make better food choices/smaller portions.  - Wakes up late and goes to bed late so eats two meals and 1-2 snacks daily.   - Was told to reduce dairy after the removal of a polyp due to constipation it would cause and still limiting such by report by choice.  - States only times she historically has noticed sugars going up was with infections and when taking steroids and surprised recently that blood sugars elevated.  - Recently stopped Levemir insulin and has maintained 75/25 insulin with advisement to increase dose by 2 units every 3-4 days as needed with elevated readings.  - Recently spoke to the office and was advised to increase 75/25 insulin to 20 units AM and 24 nightly which she states she did but also admits that she took 3 units of Levemir in addition to this last night with FBG at 114 mg/dL this day.    Nutrition Diagnosis: Food and nutrition-related knowledge deficit related to lack of " prior exposure to information as evidenced by verbalizes inaccurate or incomplete information.    Readiness to Learn:  Cognitive ability: Alert and oriented  Motivation to learn: Interested  Family Support: Unable to assess- family not present  Instruction provided to: Patient  Patient learns best by: Multiple methods  Factors affecting learning: None   Physical limitations affecting learning: None    Education Materials Provided:   Carbohydrate Counting for Diabetes    Nutrition Interventions/Recommendations for 3/14/2024:  Follow the healthy plate at meal times to assist with portion control as well as to provide well balanced meals.  Healthy Plate: Use a 9-inch diameter plate and aim for ½ plate non-starchy vegetables, ¼ plate lean protein, and ¼ plate complex carbohydrates.  Have a goal of consuming a lean source of protein at meals to assist in providing better satiety.   Aim for a total of 3 servings of healthy carbohydrate foods at each main meal (45 grams of carbs) and a total of 1 servings of healthy carbohydrate foods at snack times (15 grams of carbs).   Choose foods high in fiber such as whole grain breads, cereals, pasta, brown rice, fruits, and vegetables. These foods are digested more slowly, which helps to better control blood sugar levels.  Continue to choose foods with less sugar and fats.   Avoid high calorie snack foods and baked goods such as chips, cakes, cookies, etc. Most often as discussed. Limit these as treats for special occasions.  Keep up the great work with your daily water intakes.  Continue to check blood sugar levels regularly and take medications as prescribed. Do not add back Levemir as this was discontinued. You will continue to titrate your dose of 75/25 insulin as Shelley advised per your visit summary instructions she provided as follows: 'Look at your blood sugars every 3-4 days.  If waking up greater than 180, increase the dinner dose by 2 units. If you dinner reading is  greater than 180, you will increase your morning dose 2 units.'    Follow up with nutrition as scheduled on May 2nd at 11 AM.    Nutrition Monitoring & Evaluation: adherence to recommendations and patient stated goals    Need for follow-up:  As scheduled in May    Referred by: GARRETT Chopra    MNT Billing Type: Medical Nutrition Assessment, each 15 min increment, for 4 increments.    SIGNATURE:   Holly Guzmán RD, LD, CDCES                                                             DATE:   3/14/2024

## 2024-03-14 NOTE — PATIENT INSTRUCTIONS
Follow the healthy plate at meal times to assist with portion control as well as to provide well balanced meals.  Healthy Plate: Use a 9-inch diameter plate and aim for ½ plate non-starchy vegetables, ¼ plate lean protein, and ¼ plate complex carbohydrates.  Have a goal of consuming a lean source of protein at meals to assist in providing better satiety.   Aim for a total of 3 servings of healthy carbohydrate foods at each main meal (45 grams of carbs) and a total of 1 servings of healthy carbohydrate foods at snack times (15 grams of carbs).   Choose foods high in fiber such as whole grain breads, cereals, pasta, brown rice, fruits, and vegetables. These foods are digested more slowly, which helps to better control blood sugar levels.  Continue to choose foods with less sugar and fats.   Avoid high calorie snack foods and baked goods such as chips, cakes, cookies, etc. Most often as discussed. Limit these as treats for special occasions.  Keep up the great work with your daily water intakes.  Continue to check blood sugar levels regularly and take medications as prescribed. Do not add back Levemir as this was discontinued. You will continue to titrate your dose of 75/25 insulin as Shelley advised per your visit summary instructions she provided as follows: 'Look at your blood sugars every 3-4 days.  If waking up greater than 180, increase the dinner dose by 2 units. If you dinner reading is greater than 180, you will increase your morning dose 2 units.'    Follow up with nutrition as scheduled on May 2nd at 11 AM.

## 2024-03-20 ENCOUNTER — APPOINTMENT (OUTPATIENT)
Dept: INTEGRATIVE MEDICINE | Facility: CLINIC | Age: 69
End: 2024-03-20
Payer: COMMERCIAL

## 2024-03-27 ENCOUNTER — ALLIED HEALTH (OUTPATIENT)
Dept: INTEGRATIVE MEDICINE | Facility: CLINIC | Age: 69
End: 2024-03-27
Payer: COMMERCIAL

## 2024-03-27 DIAGNOSIS — M54.59 OTHER LOW BACK PAIN: Primary | ICD-10-CM

## 2024-03-27 DIAGNOSIS — M54.2 CERVICALGIA: ICD-10-CM

## 2024-03-27 DIAGNOSIS — G89.29 CHRONIC LEFT SHOULDER PAIN: ICD-10-CM

## 2024-03-27 DIAGNOSIS — M25.512 CHRONIC LEFT SHOULDER PAIN: ICD-10-CM

## 2024-03-27 PROCEDURE — 97811 ACUP 1/> W/O ESTIM EA ADD 15: CPT | Performed by: ACUPUNCTURIST

## 2024-03-27 PROCEDURE — 97810 ACUP 1/> WO ESTIM 1ST 15 MIN: CPT | Performed by: ACUPUNCTURIST

## 2024-03-27 NOTE — PROGRESS NOTES
Acupuncture Visit:     Subjective   Patient ID: Nahomi Saavedra is a 69 y.o. female who presents for No chief complaint on file.  PRIOR AUTHORIZATION IS REQUIRED  2024 Acupuncture Benefits (Updated)  02/12/2024 RA  [employer]                  Limitations : None  Covered Diagnosis: low back pain, migraine, cervical (neck) pain, osteoarthritis of knee and/or hip, nausea or vomiting related to pregnancy or chemo; or acute post operative pain.  Tx 4    Felt increase in pain after her tx    States she felt great after the initial  LBP, L hip, L sided neck pain, L shoulder pain  Pain is all on the LEFT side     Previous : 03/05/24  Chronic Low Back Pain: Her chronic lower back pain has been very sore this past week or so.  Today her pain is 8/10.  She is having difficulty with walking both short and long distance.  Pain is located in the lower back and travels into the left buttocks, hip and thigh.  She reports pain in the entire thigh and not a specific area.  Acupuncture has been very helpful several years ago.  She is scheduled for a nerve block on Thursday - this also worked well for her about 7 years ago.     Chronic (L) neck and shoulder pain:  she has constant pain in the left neck and shoulder.  Pain is in the left neck and trap and into the left posterior shoulder. She takes cortisone injections 3 times per year and needs daily pain medication.      Initial intake with Sanaz Sbrocco on 02/14/24  Low back pain  LEFT neck pain.    States LBP x many years.   She has 4 ruptured discs in low back.  Difficult to sleep due to pain.  She was told to sleep sitting up.  Radiation down L top thigh and hip(GB)  Better heat, biofreeze at times.  She is scheduled for a nerve block to low back 3/24/24.    States has neck pain x 1.5 yrs  Let ear pain and neck pain radiating to LEFT arm.  States she has had this since she got 2 covid shots.    Med hx includes DMII, obesity, high colesterol, HTN              Session Information  Is  this acupuncture treatment being billed to the patient's insurance company: Yes  This is visit number: 4  Initial Acupuncture Treatment date: 02/14/24  Name of Insurance Company: [employer] : NO limitations  Visit Type: Follow-up visit         Review of Systems         Provider reviewed plan for the acupuncture session, precautions and contraindications. Patient/guardian/hospital staff has given consent to treat with full understanding of what to expect during the session. Before acupuncture began, provider explained to the patient to communicate at any time if the procedure was causing discomfort past their tolerance level. Patient agreed to advise acupuncturist. The acupuncturist counseled the patient on the risks of acupuncture treatment including pain, infection, bleeding, and no relief of pain. The patient was positioned comfortably. There was no evidence of infection at the site of needle insertions.    Objective   Physical Exam              Acupuncture Treatment  Patient Position: Supine on a table  Acupuncture Needling: Yes  Needle Guage: 40 guage /.16/ Red seirin  Body Points - Left: SP9 1.5 SP6 4 - GB41 - 22.06 SI3  Body Points - Bilateral: KI6 LR3  Body Points - Right: DB/LG - SJ5  Other Techniques Utilized: TDP Lamp  TDP Lamp Descripton: high above feet  Needle Count In: 14  Needle Count Out: 14  Needle Retention Time (min): 30 minutes  Total Face to Face Time (min): 30 minutes              Assessment/Plan   There are no diagnoses linked to this encounter.  .

## 2024-04-16 ENCOUNTER — TELEMEDICINE CLINICAL SUPPORT (OUTPATIENT)
Dept: ENDOCRINOLOGY | Facility: CLINIC | Age: 69
End: 2024-04-16
Payer: COMMERCIAL

## 2024-04-16 DIAGNOSIS — E11.65 TYPE 2 DIABETES MELLITUS WITH HYPERGLYCEMIA, WITH LONG-TERM CURRENT USE OF INSULIN (MULTI): ICD-10-CM

## 2024-04-16 DIAGNOSIS — Z79.4 TYPE 2 DIABETES MELLITUS WITH HYPERGLYCEMIA, WITH LONG-TERM CURRENT USE OF INSULIN (MULTI): ICD-10-CM

## 2024-04-16 PROCEDURE — 99211 OFF/OP EST MAY X REQ PHY/QHP: CPT | Performed by: PHARMACIST

## 2024-04-16 NOTE — PROGRESS NOTES
Clinical Pharmacy Team contacted Nahomi Saavedra regarding a consultation for diabetes management thanks to a refferal from Shelley Parish CNP. Below is a summary of our conversation and recommendations:    Recommendations:  Continue current DM regimen   2. Get repeat labs before next endo appointment   ________________________________________________________________________      Allergies   Allergen Reactions    Peg 3350-Sod Sulf,Chlr-Pot-Mag Hives    Ace Inhibitors Unknown    Albiglutide Other    Cromolyn Unknown    Hydrocodone-Homatropine Unknown    Insulin Aspart Unknown    Insulin Lispro Itching    Insulin Nph Human Isophane Unknown    Oxybutynin Unknown    Penicillins Hives, Swelling and Unknown    Sulfa (Sulfonamide Antibiotics) Hives, Swelling and Unknown    Ziprasidone Unknown       Objective     There were no vitals taken for this visit.    Diabetes Pharmacotherapy:    Humalog Mix 75/25 20 units in the morning and 20 units in the evening      Lab Review  Lab Results   Component Value Date    BILITOT 0.6 12/09/2023    CALCIUM 8.5 (L) 12/11/2023    CO2 28 12/11/2023     12/11/2023    CREATININE 0.60 12/11/2023    GLUCOSE 203 (H) 12/11/2023    ALKPHOS 87 12/09/2023    K 3.6 12/11/2023    PROT 7.7 12/09/2023     12/11/2023    AST 16 12/09/2023    ALT 8 12/09/2023    BUN 11 12/11/2023    ANIONGAP 9 (L) 12/11/2023    PHOS 3.1 02/23/2023    ALBUMIN 4.3 12/09/2023    LIPASE 14 09/28/2022    GFRF >90 02/23/2023     Lab Results   Component Value Date    TRIG 152 (H) 03/02/2020    CHOL 217 (H) 03/02/2020    HDL 47.9 03/02/2020     Lab Results   Component Value Date    HGBA1C 7.9 (A) 03/04/2024     The ASCVD Risk score (Rehana DK, et al., 2019) failed to calculate for the following reasons:    Cannot find a previous HDL lab    Cannot find a previous total cholesterol lab      Monitoring     Patient reports SMBG log has consistently been <150 mg/dL and often <120 mg/dL since starting new insulin  regimen.    Assessment/Plan     The patient reports today for a diabetes consultation. Discussed DM regimen with the patient. Patient reports notable improvement in glucose trends. She attributes this to starting her new insulin, meeting with our dietican, and recovering from recent infections requiring abx therapy. Asnwered all patient questions. Given current patient reported readings. Recommend repeat A1c before next endo visit to assess glycemic control.    Patient was agreeable to this plan.        PATIENT EDUCATION/GOALS  Goals  Fasting B - 130 mg/dL  Postprandial BG: less than 180 mg/dL  A1c: less than 7%    Type of encounter: [ ivirtual ]    Provided counseling on lifestyle modifications, medications, and self-monitoring. Patient has no additional questions at this time. Pharmacy to follow up as requested. Please reach out with any questions. Thank you.       Ge Campbell, PharmD    Provider on site: Shelley Parish CNP  Continue all meds under the continuation of care with the referring provider and clinical pharmacy team.

## 2024-04-25 ENCOUNTER — ALLIED HEALTH (OUTPATIENT)
Dept: INTEGRATIVE MEDICINE | Facility: CLINIC | Age: 69
End: 2024-04-25
Payer: COMMERCIAL

## 2024-04-25 DIAGNOSIS — M54.59 OTHER LOW BACK PAIN: Primary | ICD-10-CM

## 2024-04-25 PROCEDURE — 97810 ACUP 1/> WO ESTIM 1ST 15 MIN: CPT | Performed by: ACUPUNCTURIST

## 2024-04-25 PROCEDURE — 97811 ACUP 1/> W/O ESTIM EA ADD 15: CPT | Performed by: ACUPUNCTURIST

## 2024-04-25 NOTE — PROGRESS NOTES
Acupuncture Visit:     Subjective   Patient ID: Nahomi Saavedra is a 69 y.o. female who presents for Back Pain and Neck Pain  PRIOR AUTHORIZATION IS REQUIRED  2024 Acupuncture Benefits (Updated)  02/12/2024 RA  [employer]                  Limitations : None  Covered Diagnosis: low back pain, migraine, cervical (neck) pain, osteoarthritis of knee and/or hip, nausea or vomiting related to pregnancy or chemo; or acute post operative pain.  Tx 5    States she is in a lot of pain today.  Notes last few nights she has been up tossing and turning.  L hip with pain which is new for her.    prev  Felt increase in pain after her tx    States she felt great after the initial  LBP, L hip, L sided neck pain, L shoulder pain  Pain is all on the LEFT side     Previous : 03/05/24  Chronic Low Back Pain: Her chronic lower back pain has been very sore this past week or so.  Today her pain is 8/10.  She is having difficulty with walking both short and long distance.  Pain is located in the lower back and travels into the left buttocks, hip and thigh.  She reports pain in the entire thigh and not a specific area.  Acupuncture has been very helpful several years ago.  She is scheduled for a nerve block on Thursday - this also worked well for her about 7 years ago.     Chronic (L) neck and shoulder pain:  she has constant pain in the left neck and shoulder.  Pain is in the left neck and trap and into the left posterior shoulder. She takes cortisone injections 3 times per year and needs daily pain medication.      Initial intake with Sanazmarzena Patel on 02/14/24  Low back pain  LEFT neck pain.    States LBP x many years.   She has 4 ruptured discs in low back.  Difficult to sleep due to pain.  She was told to sleep sitting up.  Radiation down L top thigh and hip(GB)  Better heat, biofreeze at times.  She is scheduled for a nerve block to low back 3/24/24.    States has neck pain x 1.5 yrs  Let ear pain and neck pain radiating to LEFT  arm.  States she has had this since she got 2 covid shots.    Med hx includes DMII, obesity, high colesterol, HTN              Session Information  Is this acupuncture treatment being billed to the patient's insurance company: Yes  This is visit number: 5  Initial Acupuncture Treatment date: 02/14/24  Name of Insurance Company: [employer] : NO limitations         Review of Systems         Provider reviewed plan for the acupuncture session, precautions and contraindications. Patient/guardian/hospital staff has given consent to treat with full understanding of what to expect during the session. Before acupuncture began, provider explained to the patient to communicate at any time if the procedure was causing discomfort past their tolerance level. Patient agreed to advise acupuncturist. The acupuncturist counseled the patient on the risks of acupuncture treatment including pain, infection, bleeding, and no relief of pain. The patient was positioned comfortably. There was no evidence of infection at the site of needle insertions.    Objective   Physical Exam              Acupuncture Treatment  Body Points - Left: SP9 1.5 SP6 4  - 22.06 SI3  Body Points - Bilateral: YT Kd 6, Lv 3, ST 44, SJ 5  Body Points - Right: GB 29, 41  Needle Count In: 18  Needle Count Out: 18  Total Face to Face Time (min): 30 minutes              Assessment/Plan   Nahomi was seen today for back pain and neck pain.  Diagnoses and all orders for this visit:  Other low back pain (Primary)    .

## 2024-05-01 ENCOUNTER — ALLIED HEALTH (OUTPATIENT)
Dept: INTEGRATIVE MEDICINE | Facility: CLINIC | Age: 69
End: 2024-05-01
Payer: COMMERCIAL

## 2024-05-01 DIAGNOSIS — M54.59 OTHER LOW BACK PAIN: Primary | ICD-10-CM

## 2024-05-01 DIAGNOSIS — M54.2 CERVICALGIA: ICD-10-CM

## 2024-05-01 PROCEDURE — 97810 ACUP 1/> WO ESTIM 1ST 15 MIN: CPT | Performed by: ACUPUNCTURIST

## 2024-05-01 PROCEDURE — 97811 ACUP 1/> W/O ESTIM EA ADD 15: CPT | Performed by: ACUPUNCTURIST

## 2024-05-01 NOTE — PROGRESS NOTES
Acupuncture Visit:     Subjective   Patient ID: Nahomi Saavedra is a 69 y.o. female who presents for No chief complaint on file.  PRIOR AUTHORIZATION IS REQUIRED  2024 Acupuncture Benefits (Updated)  02/12/2024 RA  [employer]  : NO LIMITATIONS                 Covered Diagnosis: low back pain, migraine, cervical (neck) pain, osteoarthritis of knee and/or hip, nausea or vomiting related to pregnancy or chemo; or acute post operative pain.  Tx 6    States she felt relief from pn p acu tx.  Shared she is very  happy with the results.  L low back radiaiton down L HIP and THIGH    prev  States she is in a lot of pain today.  Notes last few nights she has been up tossing and turning.  L hip with pain which is new for her.    prev  Felt increase in pain after her tx    States she felt great after the initial  LBP, L hip, L sided neck pain, L shoulder pain  Pain is all on the LEFT side     Previous : 03/05/24  Chronic Low Back Pain: Her chronic lower back pain has been very sore this past week or so.  Today her pain is 8/10.  She is having difficulty with walking both short and long distance.  Pain is located in the lower back and travels into the left buttocks, hip and thigh.  She reports pain in the entire thigh and not a specific area.  Acupuncture has been very helpful several years ago.  She is scheduled for a nerve block on Thursday - this also worked well for her about 7 years ago.     Chronic (L) neck and shoulder pain:  she has constant pain in the left neck and shoulder.  Pain is in the left neck and trap and into the left posterior shoulder. She takes cortisone injections 3 times per year and needs daily pain medication.      Initial intake with Sanaz Patel on 02/14/24  Low back pain  LEFT neck pain.    States LBP x many years.   She has 4 ruptured discs in low back.  Difficult to sleep due to pain.  She was told to sleep sitting up.  Radiation down L top thigh and hip(GB)  Better heat, biofreeze at times.  She  is scheduled for a nerve block to low back 3/24/24.    States has neck pain x 1.5 yrs  Let ear pain and neck pain radiating to LEFT arm.  States she has had this since she got 2 covid shots.    Med hx includes DMII, obesity, high colesterol, HTN                        Review of Systems         Provider reviewed plan for the acupuncture session, precautions and contraindications. Patient/guardian/hospital staff has given consent to treat with full understanding of what to expect during the session. Before acupuncture began, provider explained to the patient to communicate at any time if the procedure was causing discomfort past their tolerance level. Patient agreed to advise acupuncturist. The acupuncturist counseled the patient on the risks of acupuncture treatment including pain, infection, bleeding, and no relief of pain. The patient was positioned comfortably. There was no evidence of infection at the site of needle insertions.    Objective   Physical Exam              Acupuncture Treatment  Body Points - Left: SP9 1.5 SP6 4  - 22.06 SI3, Lv 5, GB 29, 41  Body Points - Bilateral: Kd 6, LI 4, Lv 3 ( ST 44)  Needle Count In: 16  Needle Count Out: 16  Total Face to Face Time (min): 30 minutes              Assessment/Plan   Diagnoses and all orders for this visit:  Other low back pain (Primary)  Cervicalgia    .

## 2024-05-02 ENCOUNTER — TELEMEDICINE CLINICAL SUPPORT (OUTPATIENT)
Dept: ENDOCRINOLOGY | Facility: CLINIC | Age: 69
End: 2024-05-02
Payer: COMMERCIAL

## 2024-05-02 VITALS — HEIGHT: 64 IN | BODY MASS INDEX: 37.76 KG/M2

## 2024-05-02 DIAGNOSIS — E11.9 TYPE 2 DIABETES MELLITUS WITHOUT COMPLICATION, WITH LONG-TERM CURRENT USE OF INSULIN (MULTI): Primary | ICD-10-CM

## 2024-05-02 DIAGNOSIS — Z71.3 DIETARY COUNSELING: ICD-10-CM

## 2024-05-02 DIAGNOSIS — Z79.4 TYPE 2 DIABETES MELLITUS WITHOUT COMPLICATION, WITH LONG-TERM CURRENT USE OF INSULIN (MULTI): Primary | ICD-10-CM

## 2024-05-02 PROCEDURE — 97803 MED NUTRITION INDIV SUBSEQ: CPT | Performed by: DIETITIAN, REGISTERED

## 2024-05-02 NOTE — PATIENT INSTRUCTIONS
- Continue to follow the healthy plate at meal times to assist with portion control as well as to provide well balanced meals.  - Continue to have a goal of consuming a lean source of protein at meals to assist in providing better satiety.   - Aim for a total of 3 servings of healthy carbohydrate foods at each main meal (45 grams of carbs) and a total of 1 servings of healthy carbohydrate foods at snack times (15 grams of carbs).   - Continue to choose foods high in fiber such as whole grain breads, cereals, pasta, brown rice, fruits, and vegetables. These foods are digested more slowly, which helps to better control blood sugar levels.  - Avoid high calorie snack foods and baked goods such as chips, cakes, cookies, etc. Most often as discussed.   - Keep up the great work with your daily water intakes.  - Can warm up a 1/2 cup prune juice and drink when having constipation.   - Continue to check blood sugar levels regularly and take medications as prescribed.   - Follow-up with nutrition PRN.

## 2024-05-02 NOTE — PROGRESS NOTES
Follow-up Nutrition Assessment    Interval History: Patient presents for education on healthy eating for consideration of Type 2 DM. As previously mentioned, also considered is Obesity status with pt reported goals of continued weight loss. Has lost 30 lbs intentionally over the past year and wants to work on further weight loss. Since last nutrition visit on 3/14/24, pt with an unknown weight change as she has not been weighed. States she will request a weight to be obtained at next MD visit.     Pt uses the One Touch glucose meter for daily BG checks. Verbally reports that BG levels have greatly improved with FBS ranges of 129-136 mg/dL and later in the evenings averaging between 170-196 mg/dL. States she did recently lower evening dose of insulin from 22 units down to 20 units due to lower readings and having to eat additional food at bedtime that she did not want in an effort to prevent hypoglycemia. States BG levels doing much better now with the 20 units.    Diet recall continues to reveal a meal pattern consistent of two meals and 1-2 daily snacks due to sleeping in late. Pt with ongoing good attention to smaller overall portions, as well as implementation of a wider variety of healthier foods and elimination most often of processed foods. Occasional controlled amounts in place some days by report. Trying to implement better consistency with CHO intakes. Fluids meeting recommendations in type and amount with water as primary beverage. Has recently started to add small amounts of prune juice to assist with constipation. See all interventions/recommendations below as discussed during visit this day.     Nutrition Interventions/Recommendations from last visit scheduled on 3/14/24:  Follow the healthy plate at meal times to assist with portion control as well as to provide well balanced meals.  Healthy Plate: Use a 9-inch diameter plate and aim for ½ plate non-starchy vegetables, ¼ plate lean protein, and ¼ plate  "complex carbohydrates.  Have a goal of consuming a lean source of protein at meals to assist in providing better satiety.   Aim for a total of 3 servings of healthy carbohydrate foods at each main meal (45 grams of carbs) and a total of 1 servings of healthy carbohydrate foods at snack times (15 grams of carbs).   Choose foods high in fiber such as whole grain breads, cereals, pasta, brown rice, fruits, and vegetables. These foods are digested more slowly, which helps to better control blood sugar levels.  Continue to choose foods with less sugar and fats.   Avoid high calorie snack foods and baked goods such as chips, cakes, cookies, etc. Most often as discussed. Limit these as treats for special occasions.  Keep up the great work with your daily water intakes.  Continue to check blood sugar levels regularly and take medications as prescribed. Do not add back Levemir as this was discontinued. You will continue to titrate your dose of 75/25 insulin as Shelley advised per your visit summary instructions she provided as follows: 'Look at your blood sugars every 3-4 days.  If waking up greater than 180, increase the dinner dose by 2 units. If you dinner reading is greater than 180, you will increase your morning dose 2 units.'    Follow up with nutrition as scheduled on May 2nd at 11 AM.    Anthropometrics:  Height:   Ht Readings from Last 1 Encounters:   03/14/24 1.626 m (5' 4\")      Weight:   Wt Readings from Last 10 Encounters:   03/14/24 99.8 kg (220 lb)   03/04/24 99.8 kg (220 lb)   02/21/24 103 kg (227 lb 11.8 oz)   12/09/23 104 kg (228 lb 6.3 oz)   12/07/23 104 kg (230 lb)   12/06/23 104 kg (230 lb)   11/06/23 105 kg (231 lb 7.7 oz)   10/17/23 110 kg (243 lb)   08/07/23 102 kg (225 lb)   06/12/23 107 kg (236 lb)      Current BMI:   BMI Readings from Last 1 Encounters:   03/14/24 37.76 kg/m²        Malnutrition Screening:  Significant Unintentional weight loss: No  Eating less than 75% of usual intake for more " than 2 weeks: No  Potential Signs of Inflammation: No    Recommended Malnutrition Diagnosis: No malnutrition identified    Diet Recall-   Breakfast- sardines OR whole grain toast with PB and applesauce OR oatmeal with fruit OR leftovers from dinner OR [employer] meal leftovers  Snack mid day- varies by report due to time of month and food stamp obtainment but could be celery and carrots with PB/cheese OR potato chips occasionally (snack size bag) OR fruit such as a half cup of grapes at a time  Dinner)- broiled vegetables, smaller portions of starches with turkey, chicken OR fish   Snack in evening- was having to incorporate PB bread before bed for awhile due to BG levels dropping with 22 units so dropped dose to 20 units and now not feeling like she has to incorporate such. If a snack consumed, similar to above mentioned snacks   Beverages- coffee in am, water throughout the day and occasional prune juice for constipation.     Nutrition Diagnosis: Food and nutrition-related knowledge deficit related to lack of prior exposure to information as evidenced by verbalizes inaccurate or incomplete information.     Readiness to Learn:  Cognitive ability: Alert and oriented  Motivation to learn: Interested  Family Support: Unable to assess- family not present  Instruction provided to: Patient  Patient learns best by: Multiple methods  Factors affecting learning: None   Physical limitations affecting learning: None    Education Materials Provided:   None this visit    Nutrition Interventions/Recommendations for 5/2/2024:  - Continue to follow the healthy plate at meal times to assist with portion control as well as to provide well balanced meals.  - Continue to have a goal of consuming a lean source of protein at meals to assist in providing better satiety.   - Aim for a total of 3 servings of healthy carbohydrate foods at each main meal (45 grams of carbs) and a total of 1 servings of healthy carbohydrate foods at snack times  (15 grams of carbs).   - Continue to choose foods high in fiber such as whole grain breads, cereals, pasta, brown rice, fruits, and vegetables. These foods are digested more slowly, which helps to better control blood sugar levels.  - Avoid high calorie snack foods and baked goods such as chips, cakes, cookies, etc. Most often as discussed.   - Keep up the great work with your daily water intakes.  - Can warm up a 1/2 cup prune juice and drink when having constipation.   - Continue to check blood sugar levels regularly and take medications as prescribed.   - Follow-up with nutrition PRN.    Nutrition Monitoring & Evaluation: adherence to recommendations and patient stated goals    Need for follow-up: PRN    Referred by: DESTINY Chopra-CNP    MNT Billing Type: Medical Nutrition Re-Assessment, each 15 min increment, for 2 increments.    SIGNATURE:   Holly Guzmán RD, LD, CDCES                                                             DATE:   5/2/2024

## 2024-05-08 ENCOUNTER — ALLIED HEALTH (OUTPATIENT)
Dept: INTEGRATIVE MEDICINE | Facility: CLINIC | Age: 69
End: 2024-05-08
Payer: COMMERCIAL

## 2024-05-08 DIAGNOSIS — M54.59 OTHER LOW BACK PAIN: Primary | ICD-10-CM

## 2024-05-08 PROCEDURE — 97811 ACUP 1/> W/O ESTIM EA ADD 15: CPT | Performed by: ACUPUNCTURIST

## 2024-05-08 PROCEDURE — 97810 ACUP 1/> WO ESTIM 1ST 15 MIN: CPT | Performed by: ACUPUNCTURIST

## 2024-05-08 NOTE — PROGRESS NOTES
Acupuncture Visit:     Subjective   Patient ID: Nahomi Saavedra is a 69 y.o. female who presents for Back Pain  PRIOR AUTHORIZATION IS REQUIRED  2024 Acupuncture Benefits (Updated)  02/12/2024 RA  [employer]  : NO LIMITATIONS                 LBP  Tx 7    States pain is less intense p acu tx.  Notes she was able to clean her bathroom.  L hip h/o bursitis, notes she may have bursitis again.    prev  States she felt relief from pn p acu tx.  Shared she is very  happy with the results.  L low back radiaiton down L HIP and THIGH        Previous : 03/05/24  Chronic Low Back Pain: Her chronic lower back pain has been very sore this past week or so.  Today her pain is 8/10.  She is having difficulty with walking both short and long distance.  Pain is located in the lower back and travels into the left buttocks, hip and thigh.  She reports pain in the entire thigh and not a specific area.  Acupuncture has been very helpful several years ago.  She is scheduled for a nerve block on Thursday - this also worked well for her about 7 years ago.     Chronic (L) neck and shoulder pain:  she has constant pain in the left neck and shoulder.  Pain is in the left neck and trap and into the left posterior shoulder. She takes cortisone injections 3 times per year and needs daily pain medication.      Initial intake with Sanaz Patel on 02/14/24  Low back pain  LEFT neck pain.    States LBP x many years.   She has 4 ruptured discs in low back.  Difficult to sleep due to pain.  She was told to sleep sitting up.  Radiation down L top thigh and hip(GB)  Better heat, biofreeze at times.  She is scheduled for a nerve block to low back 3/24/24.    States has neck pain x 1.5 yrs  Let ear pain and neck pain radiating to LEFT arm.  States she has had this since she got 2 covid shots.    Med hx includes DMII, obesity, high colesterol, HTN              Session Information  Is this acupuncture treatment being billed to the patient's insurance company:  Yes  This is visit number: 7  Initial Acupuncture Treatment date: 02/14/24  Name of Insurance Company: [employer]kemal Velasquez : NO limitations         Review of Systems         Provider reviewed plan for the acupuncture session, precautions and contraindications. Patient/guardian/hospital staff has given consent to treat with full understanding of what to expect during the session. Before acupuncture began, provider explained to the patient to communicate at any time if the procedure was causing discomfort past their tolerance level. Patient agreed to advise acupuncturist. The acupuncturist counseled the patient on the risks of acupuncture treatment including pain, infection, bleeding, and no relief of pain. The patient was positioned comfortably. There was no evidence of infection at the site of needle insertions.    Objective   Physical Exam              Acupuncture Treatment  Body Points - Left: ahshi L hip-5, LGDB, 88.17-9  Body Points - Bilateral: Kd 6, LI 4, Lv 3, ST 44, GB 40  Other Techniques Utilized: TDP Lamp  TDP Lamp Descripton: feet  Needle Count In: 20  Needle Count Out: 20  Total Face to Face Time (min): 30 minutes              Assessment/Plan   Nahomi was seen today for back pain.  Diagnoses and all orders for this visit:  Other low back pain (Primary)      .

## 2024-05-14 ENCOUNTER — APPOINTMENT (OUTPATIENT)
Dept: RADIOLOGY | Facility: CLINIC | Age: 69
End: 2024-05-14
Payer: COMMERCIAL

## 2024-05-20 ENCOUNTER — ALLIED HEALTH (OUTPATIENT)
Dept: INTEGRATIVE MEDICINE | Facility: CLINIC | Age: 69
End: 2024-05-20
Payer: COMMERCIAL

## 2024-05-20 DIAGNOSIS — M54.59 OTHER LOW BACK PAIN: Primary | ICD-10-CM

## 2024-05-20 PROCEDURE — 97811 ACUP 1/> W/O ESTIM EA ADD 15: CPT | Performed by: ACUPUNCTURIST

## 2024-05-20 PROCEDURE — 97810 ACUP 1/> WO ESTIM 1ST 15 MIN: CPT | Performed by: ACUPUNCTURIST

## 2024-05-20 NOTE — PROGRESS NOTES
Acupuncture Visit:     Subjective   Patient ID: Nahomi Saavedra is a 69 y.o. female who presents for Back Pain  PRIOR AUTHORIZATION IS REQUIRED  2024 Acupuncture Benefits (Updated)  02/12/2024 RA  [employer]  : NO LIMITATIONS                 LBP  Tx 7    Notes pn  relief x 2-3 days then returns.  States she is considering getting an injection to her back.  L shoulder improved.    prev  States pain is less intense p acu tx.  Notes she was able to clean her bathroom.  L hip h/o bursitis, notes she may have bursitis again.    prev  States she felt relief from pn p acu tx.  Shared she is very  happy with the results.  L low back radiaiton down L HIP and THIGH        Previous : 03/05/24  Chronic Low Back Pain: Her chronic lower back pain has been very sore this past week or so.  Today her pain is 8/10.  She is having difficulty with walking both short and long distance.  Pain is located in the lower back and travels into the left buttocks, hip and thigh.  She reports pain in the entire thigh and not a specific area.  Acupuncture has been very helpful several years ago.  She is scheduled for a nerve block on Thursday - this also worked well for her about 7 years ago.     Chronic (L) neck and shoulder pain:  she has constant pain in the left neck and shoulder.  Pain is in the left neck and trap and into the left posterior shoulder. She takes cortisone injections 3 times per year and needs daily pain medication.      Initial intake with Sanaz Sbrocco on 02/14/24  Low back pain  LEFT neck pain.    States LBP x many years.   She has 4 ruptured discs in low back.  Difficult to sleep due to pain.  She was told to sleep sitting up.  Radiation down L top thigh and hip(GB)  Better heat, biofreeze at times.  She is scheduled for a nerve block to low back 3/24/24.    States has neck pain x 1.5 yrs  Let ear pain and neck pain radiating to LEFT arm.  States she has had this since she got 2 covid shots.    Med hx includes DMII, obesity,  high colesterol, HTN              Session Information  Is this acupuncture treatment being billed to the patient's insurance company: Yes  This is visit number: 8  Initial Acupuncture Treatment date: 02/14/24  Name of Insurance Company: Tanner Velasquez : NO limitations         Review of Systems         Provider reviewed plan for the acupuncture session, precautions and contraindications. Patient/guardian/hospital staff has given consent to treat with full understanding of what to expect during the session. Before acupuncture began, provider explained to the patient to communicate at any time if the procedure was causing discomfort past their tolerance level. Patient agreed to advise acupuncturist. The acupuncturist counseled the patient on the risks of acupuncture treatment including pain, infection, bleeding, and no relief of pain. The patient was positioned comfortably. There was no evidence of infection at the site of needle insertions.    Objective   Physical Exam              Acupuncture Treatment  Body Points - Left: SP9 1.5 SP6 4  - 22.06 SI3,  GB 29-3  Body Points - Bilateral: SP 3-4,  Lv 3, ST 44  Body Points - Right: LGDB  Needle Count In: 18  Needle Count Out: 18  Total Face to Face Time (min): 30 minutes              Assessment/Plan   Nahomi was seen today for back pain.  Diagnoses and all orders for this visit:  Other low back pain (Primary)        .

## 2024-05-28 ENCOUNTER — APPOINTMENT (OUTPATIENT)
Dept: INTEGRATIVE MEDICINE | Facility: CLINIC | Age: 69
End: 2024-05-28
Payer: COMMERCIAL

## 2024-06-05 ENCOUNTER — HOSPITAL ENCOUNTER (OUTPATIENT)
Dept: RADIOLOGY | Facility: CLINIC | Age: 69
Discharge: HOME | End: 2024-06-05
Payer: COMMERCIAL

## 2024-06-05 VITALS — BODY MASS INDEX: 37.56 KG/M2 | HEIGHT: 64 IN | WEIGHT: 220.02 LBS

## 2024-06-05 DIAGNOSIS — Z12.31 ENCOUNTER FOR SCREENING MAMMOGRAM FOR MALIGNANT NEOPLASM OF BREAST: ICD-10-CM

## 2024-06-05 PROCEDURE — 77067 SCR MAMMO BI INCL CAD: CPT

## 2024-06-05 PROCEDURE — 77067 SCR MAMMO BI INCL CAD: CPT | Performed by: RADIOLOGY

## 2024-06-05 PROCEDURE — 77063 BREAST TOMOSYNTHESIS BI: CPT | Performed by: RADIOLOGY

## 2024-06-06 ENCOUNTER — TELEMEDICINE (OUTPATIENT)
Dept: ENDOCRINOLOGY | Facility: CLINIC | Age: 69
End: 2024-06-06
Payer: COMMERCIAL

## 2024-07-01 ENCOUNTER — APPOINTMENT (OUTPATIENT)
Dept: PRIMARY CARE | Facility: CLINIC | Age: 69
End: 2024-07-01
Payer: COMMERCIAL

## 2024-08-06 ENCOUNTER — APPOINTMENT (OUTPATIENT)
Dept: ORTHOPEDIC SURGERY | Facility: HOSPITAL | Age: 69
End: 2024-08-06
Payer: COMMERCIAL

## 2024-09-12 ENCOUNTER — APPOINTMENT (OUTPATIENT)
Dept: ENDOCRINOLOGY | Facility: CLINIC | Age: 69
End: 2024-09-12
Payer: COMMERCIAL

## 2024-09-12 DIAGNOSIS — E66.9 OBESITY (BMI 30-39.9): ICD-10-CM

## 2024-09-12 DIAGNOSIS — Z79.4 TYPE 2 DIABETES MELLITUS WITH HYPERGLYCEMIA, WITH LONG-TERM CURRENT USE OF INSULIN (MULTI): Primary | ICD-10-CM

## 2024-09-12 DIAGNOSIS — E11.65 TYPE 2 DIABETES MELLITUS WITH HYPERGLYCEMIA, WITH LONG-TERM CURRENT USE OF INSULIN (MULTI): Primary | ICD-10-CM

## 2024-09-12 PROCEDURE — 1159F MED LIST DOCD IN RCRD: CPT | Performed by: NURSE PRACTITIONER

## 2024-09-12 PROCEDURE — 99214 OFFICE O/P EST MOD 30 MIN: CPT | Performed by: NURSE PRACTITIONER

## 2024-09-12 PROCEDURE — 1160F RVW MEDS BY RX/DR IN RCRD: CPT | Performed by: NURSE PRACTITIONER

## 2024-09-12 NOTE — PROGRESS NOTES
"Subjective   Nahomi Saavedra is a 69 y.o. female here today for a follow up visit regarding DM Type 2. Initial diagnosis with diabetes was over 13 years (she reports).  The patient has a family history in her sisters, brothers, and mother.    Known complications include: hyperlipidemia, obesity   History of diverticulitis     She if very fearful of starting new medications  In  she was started on antidepressant.  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    Last visit with me in 2023  A1c 7.9% in 3/2024  She reports previous A1c 8.2%.    Has been able to meet with dietician and pharmD   Did not show for our last appt.  Does not have ability to do video for virtual visits.      Historical meds:  NPH - unclear   Lispro  - unclear  Aspart - unclear   Metformin - stomach cramping   Once weekly injection- \"sent me to the hospital\"  tried another    Levemir - stopped when taking 75/25      Current diabetes regimen is as follows:   Humalog mix 75/25 20 units in the morning, 20 units in the evening (self adjusted)       Patient did not like using continuous glucose monitor - has skin reactions   Does not want to try again    The patient is currently checking the blood glucose 3-4 times per day   She reports:   Fastin, 115,  119  Pre dinner: She doesn't remember   Bedtime: She doesn't remember       Hypoglycemia frequency: Reports a couple last month.    Hypoglycemia awareness: yes     The patient comes into the office today without concerns.        ROS   General: no fever, chills or acute changes in weight in the last 6 months  Skin: no rashes, pruritis or dry skin  Cardiac: denies chest pain, heart palpitations or orthopnea  Pulmonary: denies wheezing, productive cough or exertional dyspnea      Objective    Physical Exam  Unable to obtain blood pressure today due to " "virtual visit  Affect: alert, cooperative         Current Outpatient Medications:     albuterol (Ventolin HFA) 90 mcg/actuation inhaler, Inhale if needed for wheezing or shortness of breath. 1-2puffs q 4-6hrs, Disp: , Rfl:     albuterol 2.5 mg /3 mL (0.083 %) nebulizer solution, Take 1 ampule by nebulization every 6 hours if needed for wheezing or shortness of breath., Disp: , Rfl:     Allergy, diphenhydrAMINE, 25 mg capsule, Take by mouth. Allergy, diphenhydrAMINE, 25 mg capsule, Disp: , Rfl:     amLODIPine (Norvasc) 10 mg tablet, Take 1 tablet (10 mg) by mouth once daily., Disp: , Rfl:     BD Ultra-Fine Micro Pen Needle 32 gauge x 1/4\" needle, once daily. BD Ultra-Fine Micro Pen Needle 32 gauge x 1/4\" needle, Disp: , Rfl:     carboxymethylcellulose (Refresh Plus) 0.5 % ophthalmic solution, Administer 1 drop into both eyes if needed for dry eyes., Disp: , Rfl:     diazePAM (Valium) 5 mg tablet, Take 1 tablet (5 mg) by mouth every 8 hours if needed for anxiety. Max daily dose of 3, Disp: , Rfl:     HumaLOG Mix 75-25 KwikPen 100 unit/mL (75-25) injection, Use up to 26 units twice daily with meals, Disp: 60 mL, Rfl: 1    hydrALAZINE (Apresoline) 25 mg tablet, Take 1 tablet (25 mg) by mouth every 6 hours. For hypertension, Disp: , Rfl:     hydroCHLOROthiazide (HYDRODiuril) 25 mg tablet, Take 1 tablet (25 mg) by mouth once daily., Disp: , Rfl:     ibuprofen (IBU) 600 mg tablet, Take 1 tablet (600 mg) by mouth once daily as needed (pain)., Disp: , Rfl:     lancets (OneTouch Delica Plus Lancet) 30 gauge misc, , Disp: , Rfl:     lidocaine (Lidoderm) 5 % patch, Place on the skin. lidocaine (Lidoderm) 5 % patch, Disp: , Rfl:     OneTouch Verio test strips strip, 4 times a day., Disp: , Rfl:     oxyCODONE-acetaminophen (Percocet)  mg tablet, Take by mouth., Disp: , Rfl:     oxyCODONE-acetaminophen (Percocet) 5-325 mg tablet, Take by mouth. oxyCODONE-acetaminophen (Percocet) 5-325 mg tablet, Disp: , Rfl:     sertraline " (Zoloft) 50 mg tablet, Take by mouth. sertraline (Zoloft) 50 mg tablet, Disp: , Rfl:     triamcinolone (Kenalog) 0.5 % cream, Apply topically. triamcinolone (Kenalog) 0.5 % cream, Disp: , Rfl:     Xarelto 2.5 mg tablet, Take 1 tablet (2.5 mg) by mouth once daily., Disp: , Rfl:     Assessment/Plan   Type 2 diabetes with hyperglycemia with long-term use of insulin:  Obesity:  -No recent A1c on file.  Patient has not gotten labs.  Reports having blood work drawn at PCP office and will try to get results sent over.  She has self adjusted her Humalog to 20 units twice daily.  Reports occasional low blood sugar waking up when she does not eat enough at dinner.  Could only report morning blood sugars to me in the low 100s to 110s.  She reports not having the meter in front of her.  Unable to get video connected and ideally not a good candidate for a virtual visits.  Discussed seeing patient in office once yearly and next visit will need to be in person.  Without data no changes today.    Plan:   Continue Humalog 75/25 20 units twice daily   Continue checking the blood sugars at least twice daily   Follow up 4-5 months in person     Addendum:   -Called PCP.  Patient has not had labs since Jan.  I had A1c previous to this.  She need to get labs or have them sent to us.    -Called patient reports I should be able to see labs from [facility] from the other day.    -Last labs in the system are from 6/2024 and no A1c on file   -I have asked her to get a copy to fax over.  She reports she does not have time right now.  I will send her a gBox text with information with fax number or she can get additional labs at any  lab.    - May need to transition care to PCP   DESTINY Mohamud-CNP

## 2024-09-12 NOTE — Clinical Note
She is a little fussy.  She was supposed to make follow up in 3 months virtual, 6 monhts in person.  She did not make the 6 month in person.  The next visit will be her one year adam anyway and discussed she needs to be in person or she could follow up with PCP for her diabetes.  Can you please help arrange 4-5 month dm follow up in person?  Thanks.  Shelley Parish, APRN-CNP

## 2024-10-29 ENCOUNTER — HOSPITAL ENCOUNTER (EMERGENCY)
Facility: HOSPITAL | Age: 69
Discharge: LEFT BEFORE DISCHARGE | End: 2024-10-30
Attending: EMERGENCY MEDICINE
Payer: COMMERCIAL

## 2024-10-29 ENCOUNTER — APPOINTMENT (OUTPATIENT)
Dept: RADIOLOGY | Facility: HOSPITAL | Age: 69
End: 2024-10-29
Payer: COMMERCIAL

## 2024-10-29 DIAGNOSIS — R10.9 ABDOMINAL PAIN, UNSPECIFIED ABDOMINAL LOCATION: Primary | ICD-10-CM

## 2024-10-29 LAB
ALBUMIN SERPL BCP-MCNC: 4.1 G/DL (ref 3.4–5)
ALP SERPL-CCNC: 75 U/L (ref 33–136)
ALT SERPL W P-5'-P-CCNC: 8 U/L (ref 7–45)
ANION GAP SERPL CALC-SCNC: 11 MMOL/L (ref 10–20)
APPEARANCE UR: ABNORMAL
AST SERPL W P-5'-P-CCNC: 10 U/L (ref 9–39)
BACTERIA #/AREA URNS AUTO: ABNORMAL /HPF
BASOPHILS # BLD AUTO: 0.04 X10*3/UL (ref 0–0.1)
BASOPHILS NFR BLD AUTO: 0.5 %
BILIRUB SERPL-MCNC: 0.4 MG/DL (ref 0–1.2)
BILIRUB UR STRIP.AUTO-MCNC: NEGATIVE MG/DL
BUN SERPL-MCNC: 16 MG/DL (ref 6–23)
CALCIUM SERPL-MCNC: 9.8 MG/DL (ref 8.6–10.3)
CHLORIDE SERPL-SCNC: 101 MMOL/L (ref 98–107)
CO2 SERPL-SCNC: 33 MMOL/L (ref 21–32)
COLOR UR: YELLOW
CREAT SERPL-MCNC: 0.68 MG/DL (ref 0.5–1.05)
EGFRCR SERPLBLD CKD-EPI 2021: >90 ML/MIN/1.73M*2
EOSINOPHIL # BLD AUTO: 0.13 X10*3/UL (ref 0–0.7)
EOSINOPHIL NFR BLD AUTO: 1.5 %
ERYTHROCYTE [DISTWIDTH] IN BLOOD BY AUTOMATED COUNT: 13.4 % (ref 11.5–14.5)
GLUCOSE SERPL-MCNC: 289 MG/DL (ref 74–99)
GLUCOSE UR STRIP.AUTO-MCNC: ABNORMAL MG/DL
HCT VFR BLD AUTO: 38.5 % (ref 36–46)
HGB BLD-MCNC: 12.6 G/DL (ref 12–16)
IMM GRANULOCYTES # BLD AUTO: 0.03 X10*3/UL (ref 0–0.7)
IMM GRANULOCYTES NFR BLD AUTO: 0.4 % (ref 0–0.9)
KETONES UR STRIP.AUTO-MCNC: NEGATIVE MG/DL
LACTATE SERPL-SCNC: 1.3 MMOL/L (ref 0.4–2)
LEUKOCYTE ESTERASE UR QL STRIP.AUTO: ABNORMAL
LIPASE SERPL-CCNC: 27 U/L (ref 9–82)
LYMPHOCYTES # BLD AUTO: 1.95 X10*3/UL (ref 1.2–4.8)
LYMPHOCYTES NFR BLD AUTO: 22.9 %
MCH RBC QN AUTO: 29.6 PG (ref 26–34)
MCHC RBC AUTO-ENTMCNC: 32.7 G/DL (ref 32–36)
MCV RBC AUTO: 91 FL (ref 80–100)
MONOCYTES # BLD AUTO: 0.56 X10*3/UL (ref 0.1–1)
MONOCYTES NFR BLD AUTO: 6.6 %
MUCOUS THREADS #/AREA URNS AUTO: ABNORMAL /LPF
NEUTROPHILS # BLD AUTO: 5.79 X10*3/UL (ref 1.2–7.7)
NEUTROPHILS NFR BLD AUTO: 68.1 %
NITRITE UR QL STRIP.AUTO: NEGATIVE
NRBC BLD-RTO: 0 /100 WBCS (ref 0–0)
PH UR STRIP.AUTO: 5.5 [PH]
PLATELET # BLD AUTO: 272 X10*3/UL (ref 150–450)
POTASSIUM SERPL-SCNC: 4.7 MMOL/L (ref 3.5–5.3)
PROT SERPL-MCNC: 7.3 G/DL (ref 6.4–8.2)
PROT UR STRIP.AUTO-MCNC: ABNORMAL MG/DL
RBC # BLD AUTO: 4.25 X10*6/UL (ref 4–5.2)
RBC # UR STRIP.AUTO: NEGATIVE /UL
RBC #/AREA URNS AUTO: ABNORMAL /HPF
SODIUM SERPL-SCNC: 140 MMOL/L (ref 136–145)
SP GR UR STRIP.AUTO: 1.03
SQUAMOUS #/AREA URNS AUTO: ABNORMAL /HPF
UROBILINOGEN UR STRIP.AUTO-MCNC: NORMAL MG/DL
WBC # BLD AUTO: 8.5 X10*3/UL (ref 4.4–11.3)
WBC #/AREA URNS AUTO: ABNORMAL /HPF

## 2024-10-29 PROCEDURE — 85025 COMPLETE CBC W/AUTO DIFF WBC: CPT | Performed by: NURSE PRACTITIONER

## 2024-10-29 PROCEDURE — 74177 CT ABD & PELVIS W/CONTRAST: CPT

## 2024-10-29 PROCEDURE — 2550000001 HC RX 255 CONTRASTS: Performed by: NURSE PRACTITIONER

## 2024-10-29 PROCEDURE — 83690 ASSAY OF LIPASE: CPT | Performed by: NURSE PRACTITIONER

## 2024-10-29 PROCEDURE — 87086 URINE CULTURE/COLONY COUNT: CPT | Mod: AHULAB | Performed by: NURSE PRACTITIONER

## 2024-10-29 PROCEDURE — 99284 EMERGENCY DEPT VISIT MOD MDM: CPT | Mod: 25 | Performed by: EMERGENCY MEDICINE

## 2024-10-29 PROCEDURE — 83605 ASSAY OF LACTIC ACID: CPT | Performed by: NURSE PRACTITIONER

## 2024-10-29 PROCEDURE — 36415 COLL VENOUS BLD VENIPUNCTURE: CPT | Performed by: NURSE PRACTITIONER

## 2024-10-29 PROCEDURE — 81001 URINALYSIS AUTO W/SCOPE: CPT | Performed by: NURSE PRACTITIONER

## 2024-10-29 PROCEDURE — 74177 CT ABD & PELVIS W/CONTRAST: CPT | Performed by: RADIOLOGY

## 2024-10-29 PROCEDURE — 80053 COMPREHEN METABOLIC PANEL: CPT | Performed by: NURSE PRACTITIONER

## 2024-10-29 ASSESSMENT — PAIN DESCRIPTION - LOCATION: LOCATION: ABDOMEN

## 2024-10-29 ASSESSMENT — PAIN DESCRIPTION - PAIN TYPE: TYPE: ACUTE PAIN

## 2024-10-29 ASSESSMENT — COLUMBIA-SUICIDE SEVERITY RATING SCALE - C-SSRS
1. IN THE PAST MONTH, HAVE YOU WISHED YOU WERE DEAD OR WISHED YOU COULD GO TO SLEEP AND NOT WAKE UP?: NO
2. HAVE YOU ACTUALLY HAD ANY THOUGHTS OF KILLING YOURSELF?: NO
6. HAVE YOU EVER DONE ANYTHING, STARTED TO DO ANYTHING, OR PREPARED TO DO ANYTHING TO END YOUR LIFE?: NO

## 2024-10-29 ASSESSMENT — PAIN DESCRIPTION - ORIENTATION: ORIENTATION: LEFT

## 2024-10-29 ASSESSMENT — PAIN - FUNCTIONAL ASSESSMENT: PAIN_FUNCTIONAL_ASSESSMENT: 0-10

## 2024-10-29 ASSESSMENT — PAIN SCALES - GENERAL: PAINLEVEL_OUTOF10: 6

## 2024-10-29 ASSESSMENT — PAIN DESCRIPTION - DESCRIPTORS: DESCRIPTORS: ACHING

## 2024-10-30 VITALS
HEART RATE: 74 BPM | TEMPERATURE: 98.3 F | DIASTOLIC BLOOD PRESSURE: 85 MMHG | RESPIRATION RATE: 18 BRPM | SYSTOLIC BLOOD PRESSURE: 180 MMHG | OXYGEN SATURATION: 96 %

## 2024-10-30 RX ORDER — CIPROFLOXACIN 500 MG/1
500 TABLET ORAL 2 TIMES DAILY
Qty: 14 TABLET | Refills: 0 | Status: SHIPPED | OUTPATIENT
Start: 2024-10-30 | End: 2024-11-06

## 2024-10-30 RX ORDER — METRONIDAZOLE 500 MG/1
500 TABLET ORAL 3 TIMES DAILY
Qty: 21 TABLET | Refills: 0 | Status: SHIPPED | OUTPATIENT
Start: 2024-10-30 | End: 2024-11-06

## 2024-10-31 LAB — BACTERIA UR CULT: NORMAL

## 2024-12-04 ENCOUNTER — APPOINTMENT (OUTPATIENT)
Dept: ENDOCRINOLOGY | Facility: CLINIC | Age: 69
End: 2024-12-04
Payer: COMMERCIAL

## 2025-02-18 ENCOUNTER — APPOINTMENT (OUTPATIENT)
Dept: ENDOCRINOLOGY | Facility: CLINIC | Age: 70
End: 2025-02-18
Payer: COMMERCIAL

## 2025-02-18 VITALS
BODY MASS INDEX: 40.33 KG/M2 | DIASTOLIC BLOOD PRESSURE: 75 MMHG | TEMPERATURE: 98.8 F | RESPIRATION RATE: 22 BRPM | HEART RATE: 92 BPM | HEIGHT: 64 IN | SYSTOLIC BLOOD PRESSURE: 146 MMHG | WEIGHT: 236.2 LBS

## 2025-02-18 DIAGNOSIS — E11.65 TYPE 2 DIABETES MELLITUS WITH HYPERGLYCEMIA, WITH LONG-TERM CURRENT USE OF INSULIN: Primary | ICD-10-CM

## 2025-02-18 DIAGNOSIS — E66.01 OBESITY, CLASS III, BMI 40-49.9 (MORBID OBESITY) (MULTI): ICD-10-CM

## 2025-02-18 DIAGNOSIS — Z79.4 TYPE 2 DIABETES MELLITUS WITH HYPERGLYCEMIA, WITH LONG-TERM CURRENT USE OF INSULIN: Primary | ICD-10-CM

## 2025-02-18 LAB — POC HEMOGLOBIN A1C: 7.7 % (ref 4.2–6.5)

## 2025-02-18 PROCEDURE — G2211 COMPLEX E/M VISIT ADD ON: HCPCS | Performed by: NURSE PRACTITIONER

## 2025-02-18 PROCEDURE — 1159F MED LIST DOCD IN RCRD: CPT | Performed by: NURSE PRACTITIONER

## 2025-02-18 PROCEDURE — 3077F SYST BP >= 140 MM HG: CPT | Performed by: NURSE PRACTITIONER

## 2025-02-18 PROCEDURE — 3008F BODY MASS INDEX DOCD: CPT | Performed by: NURSE PRACTITIONER

## 2025-02-18 PROCEDURE — 1160F RVW MEDS BY RX/DR IN RCRD: CPT | Performed by: NURSE PRACTITIONER

## 2025-02-18 PROCEDURE — 99215 OFFICE O/P EST HI 40 MIN: CPT | Performed by: NURSE PRACTITIONER

## 2025-02-18 PROCEDURE — 3078F DIAST BP <80 MM HG: CPT | Performed by: NURSE PRACTITIONER

## 2025-02-18 PROCEDURE — 83036 HEMOGLOBIN GLYCOSYLATED A1C: CPT | Performed by: NURSE PRACTITIONER

## 2025-02-18 ASSESSMENT — PATIENT HEALTH QUESTIONNAIRE - PHQ9
SUM OF ALL RESPONSES TO PHQ9 QUESTIONS 1 AND 2: 2
2. FEELING DOWN, DEPRESSED OR HOPELESS: SEVERAL DAYS
1. LITTLE INTEREST OR PLEASURE IN DOING THINGS: SEVERAL DAYS

## 2025-02-18 NOTE — PATIENT INSTRUCTIONS
Continue 75/25 20 units in the morning, 22 units in the evening.      Bring your meter to your appt with pharmacy     Schedule with pharmacy    Schedule dietician     Follow up in 5-6 months      Consider PCSK9 injection like Repatha when you cannot take statin medication        Add protein to smoothie by choosing one of the followin ounces fat free greek yogurt OR   1 scoop whey protein powder (check the label to make sure the protein powder has 10-21 grams of protein per serving) OR   2-3 Tbsp natural peanut butter (or try PB2: a powdered peanut butter for a low fat protein option)      Smoothie Recipes    Basic Protein Shake   ¼ cup fat-free half and half or almond milk   ½ cup water   1 scoop of vanilla whey protein powder (chocolate, banana, strawberry etc)   3-4 ice cubes     Tropical Smoothie   ½ cup coconut milk, fat-free half & half, almond milk, or low milk   4 ounces or ½ cup frozen berries (strawberries, raspberries etc)   1 tsp Splenda or packet artificial sweetener (if desired)   1 scoop vanilla whey protein powder     Berry Fizz Shake   ½ cup Sparkling water   ½ cup frozen berries   2 Tbsp fat-free half and half or milk or almond milk   1 scoop vanilla whey protein powder     Moran Smoothie   ½ cup water   ¼ cup almond milk or low fat milk   1 tsp (or more to taste) almond extract   1 scoop vanilla whey protein powder     Peanut Butter Smoothie   ¼ cup almond milk or low fat milk or fat free half and half   ½ cup water   1 scoop vanilla whey protein powder   1 tbsp reduce sugar or low fat peanut butter or try 2 Tbsp PB2     Mocha Shake   1 scoop chocolate whey protein powder   ¼ cup almond milk or low fat milk or fat free half and half   1 tsp instant coffee (dissolved in 1 tbsp boiling water)   1 tsp Splenda or packet of artificial sweetener   ½ cup water   3-4 ice cubes

## 2025-02-18 NOTE — PROGRESS NOTES
"Subjective   Nahomi Saavedra is a 70 y.o. female here today for a follow up visit regarding DM Type 2. Initial diagnosis with diabetes was over 13 years (she reports).  The patient has a family history in her sisters, brothers, and mother.    Known complications include: hyperlipidemia, obesity   History of diverticulitis     She if very fearful of starting new medications  In  she was started on antidepressant.  She had a reaction and she tried to commit suicide  She then ended up in half-way for 7 years   She adds to her fear of trying new medications      Previously seeing PCP for diabetes care.    Last visit with me in 2024  A1c 7.7% today.  Previous A1c 7.9% in 3/2024     Has been able to meet with dietician and pharmD   Since last visit, she had ED admission for diverticulitis and a hernia   Had more constipation-  eating more prunes, prune juice, raisins  Used miralax over the counter   Statin intolerant- broke out in rash    Late today due to transportation   She is tearful today and just frustrated that she cannot lose weight     Historical meds:  NPH - unclear   Lispro  - unclear  Aspart - unclear   Metformin - stomach cramping   Once weekly injection- \"sent me to the hospital\"  tried another    Levemir - stopped when taking 75/25      Current diabetes regimen is as follows:   Humalog mix 75/25 20 units in the morning, 22-24 units in the evening (self adjusted)   If blood sugars around 175-190 at dinner, she will give around 22 units   If the blood sugars are over 190-200 she takes 24 units.        Patient did not like using continuous glucose monitor - has skin reactions   Does not want to try again    The patient is currently checking the blood glucose 3-4 times per day   She reports:   Fastin-120s  Pre dinner: 180-190  Bedtime: she forgets       Hypoglycemia frequency: Denies     Hypoglycemia awareness: yes     The patient comes into the office today wanting to lose weight.        ROS   General: " "no fever, chills or acute changes in weight in the last 6 months  Skin: no rashes, pruritis or dry skin  Cardiac: denies chest pain, heart palpitations or orthopnea  Pulmonary: denies wheezing, productive cough or exertional dyspnea      Objective   Physical Exam  Blood pressure 146/75, pulse 92, temperature 37.1 °C (98.8 °F), temperature source Tympanic, resp. rate 22, height 1.626 m (5' 4\"), weight 107 kg (236 lb 3.2 oz).  General: not in acute distress, cooperative   Respiratory: normal respiratory effort  Musculoskeletal: normal gait           Current Outpatient Medications:     albuterol (Ventolin HFA) 90 mcg/actuation inhaler, Inhale if needed for wheezing or shortness of breath. 1-2puffs q 4-6hrs, Disp: , Rfl:     albuterol 2.5 mg /3 mL (0.083 %) nebulizer solution, Take 1 ampule by nebulization every 6 hours if needed for wheezing or shortness of breath., Disp: , Rfl:     Allergy, diphenhydrAMINE, 25 mg capsule, Take by mouth. Allergy, diphenhydrAMINE, 25 mg capsule, Disp: , Rfl:     amLODIPine (Norvasc) 10 mg tablet, Take 1 tablet (10 mg) by mouth once daily., Disp: , Rfl:     BD Ultra-Fine Micro Pen Needle 32 gauge x 1/4\" needle, once daily. BD Ultra-Fine Micro Pen Needle 32 gauge x 1/4\" needle, Disp: , Rfl:     carboxymethylcellulose (Refresh Plus) 0.5 % ophthalmic solution, Administer 1 drop into both eyes if needed for dry eyes., Disp: , Rfl:     diazePAM (Valium) 5 mg tablet, Take 1 tablet (5 mg) by mouth every 8 hours if needed for anxiety. Max daily dose of 3, Disp: , Rfl:     HumaLOG Mix 75-25 KwikPen 100 unit/mL (75-25) injection, Use up to 26 units twice daily with meals, Disp: 60 mL, Rfl: 1    hydrALAZINE (Apresoline) 25 mg tablet, Take 1 tablet (25 mg) by mouth every 6 hours. For hypertension, Disp: , Rfl:     hydroCHLOROthiazide (HYDRODiuril) 25 mg tablet, Take 1 tablet (25 mg) by mouth once daily., Disp: , Rfl:     ibuprofen (IBU) 600 mg tablet, Take 1 tablet (600 mg) by mouth once daily as " needed (pain)., Disp: , Rfl:     lancets (OneTouch Delica Plus Lancet) 30 gauge misc, , Disp: , Rfl:     lidocaine (Lidoderm) 5 % patch, Place on the skin. lidocaine (Lidoderm) 5 % patch, Disp: , Rfl:     OneTouch Verio test strips strip, 4 times a day., Disp: , Rfl:     oxyCODONE-acetaminophen (Percocet)  mg tablet, Take by mouth., Disp: , Rfl:     oxyCODONE-acetaminophen (Percocet) 5-325 mg tablet, Take by mouth. oxyCODONE-acetaminophen (Percocet) 5-325 mg tablet, Disp: , Rfl:     sertraline (Zoloft) 50 mg tablet, Take by mouth. sertraline (Zoloft) 50 mg tablet, Disp: , Rfl:     triamcinolone (Kenalog) 0.5 % cream, Apply topically. triamcinolone (Kenalog) 0.5 % cream, Disp: , Rfl:     Xarelto 2.5 mg tablet, Take 1 tablet (2.5 mg) by mouth once daily., Disp: , Rfl:     Assessment/Plan   Type 2 diabetes with hyperglycemia with long-term use of insulin:  Obesity BMI 40:  - A1c not at goal, slightly improved.  She has self adjusted her evening dose.  Unable to provider readings today and cannot tell me from memory.  Without data unable to make adjustments to her insulin today.  Recommend short term follow up with PharmD.  Reinforced bringing her meter to that appt for download.  Additionally she is interested in losing weight and would like to see a dietician.  She has make adjustments to her diet but she is unsure why she is not losing.  Dietician can help us figure this out.  Also discussed impact of insulin on weight.  Unwilling to try new orals at this time to lower insulin resistance.  She is best for in person visits.      Plan:   Continue 75/25 20 units in the morning, 22 units in the evening.    Bring your meter to your appt with pharmacy   Schedule with pharmacy  Schedule dietician   Follow up in 5-6 months

## 2025-03-11 ENCOUNTER — APPOINTMENT (OUTPATIENT)
Dept: ENDOCRINOLOGY | Facility: CLINIC | Age: 70
End: 2025-03-11
Payer: COMMERCIAL

## 2025-04-01 ENCOUNTER — APPOINTMENT (OUTPATIENT)
Dept: ENDOCRINOLOGY | Facility: CLINIC | Age: 70
End: 2025-04-01
Payer: COMMERCIAL

## 2025-04-01 VITALS — BODY MASS INDEX: 39.91 KG/M2 | WEIGHT: 233.8 LBS | HEIGHT: 64 IN

## 2025-04-01 DIAGNOSIS — Z79.4 TYPE 2 DIABETES MELLITUS WITH HYPERGLYCEMIA, WITH LONG-TERM CURRENT USE OF INSULIN: ICD-10-CM

## 2025-04-01 DIAGNOSIS — E66.01 OBESITY, CLASS III, BMI 40-49.9 (MORBID OBESITY) (MULTI): ICD-10-CM

## 2025-04-01 DIAGNOSIS — Z71.3 DIETARY COUNSELING: Primary | ICD-10-CM

## 2025-04-01 DIAGNOSIS — E11.65 TYPE 2 DIABETES MELLITUS WITH HYPERGLYCEMIA, WITH LONG-TERM CURRENT USE OF INSULIN: ICD-10-CM

## 2025-04-01 NOTE — PROGRESS NOTES
"Initial Nutrition Assessment    Patient was referred to nutrition by GARRETT Chopra for weight management/desire to lose weight, as well as for education on healthy eating for consideration of Type 2 Diabetes. Pertinent labs reviewed which show A1C 7.7%.     Diet recall reveals an inconsistent meal pattern with frequently missed meals, as well as reported intakes of larger portions and calorically dense foods all likely contributing to lack of desired weight loss/contributing to weight gain over time. Fluids meeting recommendations in type and amount with water as primary beverage. Pt is not incorporating consistent physical activity at this time and would likely benefit from increased structured activity to assist in achieving goals.       See all interventions/recommendations below as discussed during visit this day.     Patient reported symptoms: Difficulty losing weight    Anthropometrics:  Height:   Ht Readings from Last 1 Encounters:   04/01/25 1.626 m (5' 4\")      Weight:   Wt Readings from Last 10 Encounters:   04/01/25 106 kg (233 lb 12.8 oz)   02/18/25 107 kg (236 lb 3.2 oz)   06/05/24 99.8 kg (220 lb 0.3 oz)   03/14/24 99.8 kg (220 lb)   03/04/24 99.8 kg (220 lb)   02/21/24 103 kg (227 lb 11.8 oz)   12/09/23 104 kg (228 lb 6.3 oz)   12/07/23 104 kg (230 lb)   12/06/23 104 kg (230 lb)   11/06/23 105 kg (231 lb 7.7 oz)      Current BMI:   BMI Readings from Last 1 Encounters:   04/01/25 40.13 kg/m²        Labs:  Lab Results   Component Value Date    HGBA1C 7.7 (A) 02/18/2025      Lab Results   Component Value Date    CHOL 217 (H) 03/02/2020     Lab Results   Component Value Date    HDL 47.9 03/02/2020     No results found for: \"LDLCALC\"  Lab Results   Component Value Date    TRIG 152 (H) 03/02/2020       Malnutrition Screening:  Significant Unintentional weight loss: No  Eating less than 75% of usual intake for more than 2 weeks: No  Potential Signs of Inflammation: No    Recommended Malnutrition " Diagnosis: No malnutrition identified    Diet Recall-  Breakfast- Grits and a scrambled egg with applesauce OR oatmeal OR mckeon and bagel OR   Berries, megan, orange, 1/2 protein shake   Lunch- Skips due to a late breakfast   Dinner- 1/2 baked potato, chicken, okra   Daily Snacks- Cheese, crackers, fruit, turkey, celery with cream cheese or PB and celery, nuts, candy bar or ice cream at 3:30   Beverages- 2 oz OJ, water 4-5 bottles of water, coffee sometimes   Alcohol- social/rare     Physical Activity- none    Other pertinent patient reported Information:  - Past weight loss attempts include: self directed diet with less processed foods. Reports drinking boost which caused weight gain.   - Finger sticks 4 x per day.   -Reports she is unwilling to exercise due to arm and back pain, discussed going for walks when able.     Nutrition Diagnosis: Overweight/Obesity related to food-and-nutrition-related knowledge deficit as evidenced by abnormal Hgb A1C and BMI above normative standard for age and gender.    Readiness to Learn:  Cognitive ability: Alert and oriented  Motivation to learn: Eager and Interested  Family Support: Unable to assess- family not present  Instruction provided to: Patient  Patient learns best by: Multiple methods  Factors affecting learning: None   Physical limitations affecting learning: None    Education Materials Provided:   Your Mediterranean Meal Plan Booklet    Nutrition Interventions/Recommendations for 4/1/2025:  - Please refer to your book entitled: Your Mediterranean Meal Plan, and follow Mediterranean Diet eating guidelines as reviewed.  - The Healthy Plate style of eating can be a helpful tool for incorporating healthy balanced meals in appropriate portions. (Healthy Plate: Start with a 9-inch diameter plate. Fill 1/2 the plate with non-starchy vegetables, 1/4 of the plate with whole grains or starchy vegetables, and 1/4 of the plate with a lean source of protein.   - Please aim for a  source of healthy protein and fiber rich foods at meals as discussed for nutrition needs as well as to help provide better satiety at meals.   - Consider pre-planning healthy meals for the week. Refer to your book for both menu and recipe ideas to get you started.  - Incorporate strategies of mindful eating every day. Practice staying in tune with your body's hunger cues and eat only when truly hungry. Avoid emotional eating/eating when not hungry.  - Continue to aim for 64 ounces of water daily.  -Exercise as tolerated, start by walking for five minutes and slowly increase.   - Follow-up as scheduled with GARRETT Chopra  - Follow-up with nutrition in 4-6 weeks.      Nutrition Monitoring & Evaluation: adherence to recommendations and patient stated goals    Need for follow-up:  - Follow-up as scheduled with GARRETT Chopra. Follow-up with nutrition in 4-6 weeks.      Referred by: GARRETT Chopra    MNT Billing Type: Medical Nutrition Assessment, each 15 min increment, for 3 increments.    SIGNATURE:   Viktoriya Perla MS, RD, LD                                                      DATE:   4/1/2025

## 2025-04-01 NOTE — PATIENT INSTRUCTIONS
- Please refer to your book entitled: Your Mediterranean Meal Plan, and follow Mediterranean Diet eating guidelines as reviewed.  - The Healthy Plate style of eating can be a helpful tool for incorporating healthy balanced meals in appropriate portions. (Healthy Plate: Start with a 9-inch diameter plate. Fill 1/2 the plate with non-starchy vegetables, 1/4 of the plate with whole grains or starchy vegetables, and 1/4 of the plate with a lean source of protein.   - Please aim for a source of healthy protein and fiber rich foods at meals as discussed for nutrition needs as well as to help provide better satiety at meals.   - Consider pre-planning healthy meals for the week. Refer to your book for both menu and recipe ideas to get you started.  - Incorporate strategies of mindful eating every day. Practice staying in tune with your body's hunger cues and eat only when truly hungry. Avoid emotional eating/eating when not hungry.  - Continue to aim for 64 ounces of water daily.  -Exercise as tolerated, start by walking for five minutes and slowly increase.   - Follow-up as scheduled with GARRETT Chopra  - Follow-up with nutrition in 4-6 weeks.

## 2025-04-04 ENCOUNTER — APPOINTMENT (OUTPATIENT)
Dept: NUTRITION | Facility: HOSPITAL | Age: 70
End: 2025-04-04
Payer: COMMERCIAL

## 2025-04-04 ENCOUNTER — TELEPHONE (OUTPATIENT)
Dept: PAIN MEDICINE | Facility: CLINIC | Age: 70
End: 2025-04-04
Payer: COMMERCIAL

## 2025-04-08 ENCOUNTER — TELEPHONE (OUTPATIENT)
Dept: PAIN MEDICINE | Facility: CLINIC | Age: 70
End: 2025-04-08
Payer: COMMERCIAL

## 2025-04-08 NOTE — TELEPHONE ENCOUNTER
Called patient and LVM to [facility] office to set up a follow up appt Per Dr. Wiseman Pt has to be seen before we can set up next injection.

## 2025-04-28 ENCOUNTER — OFFICE VISIT (OUTPATIENT)
Dept: ORTHOPEDIC SURGERY | Facility: CLINIC | Age: 70
End: 2025-04-28
Payer: COMMERCIAL

## 2025-04-28 ENCOUNTER — HOSPITAL ENCOUNTER (OUTPATIENT)
Dept: RADIOLOGY | Facility: CLINIC | Age: 70
Discharge: HOME | End: 2025-04-28
Payer: COMMERCIAL

## 2025-04-28 DIAGNOSIS — M25.511 RIGHT SHOULDER PAIN, UNSPECIFIED CHRONICITY: ICD-10-CM

## 2025-04-28 PROCEDURE — 99213 OFFICE O/P EST LOW 20 MIN: CPT | Performed by: ORTHOPAEDIC SURGERY

## 2025-04-28 PROCEDURE — 73030 X-RAY EXAM OF SHOULDER: CPT | Mod: RIGHT SIDE | Performed by: RADIOLOGY

## 2025-04-28 PROCEDURE — 73030 X-RAY EXAM OF SHOULDER: CPT | Mod: RT

## 2025-04-28 NOTE — PROGRESS NOTES
"Chief Complaint   Chief Complaint   Patient presents with    Right Shoulder - Pain         HPI:      Nahomi Saavedra is a pleasant 70 y.o. year-old female who is seen today for progressive right houlder pain since she has had treatment has included acupuncture injections without relief she has pain at rest pain with activity and difficulty using that arm no numbness or tingling in the arm    Review of Systems all other body systems have been reviewed and are negative for complaint.  See intake sheet which was reviewed and scanned in the chart    There were no vitals filed for this visit.    Medical History[1]  Problem List[2]    Medication Documentation Review Audit       Reviewed by Ale Mendoza MA (Medical Assistant) on 04/28/25 at 1546      Medication Order Taking? Sig Documenting Provider Last Dose Status   albuterol (Ventolin HFA) 90 mcg/actuation inhaler [id number] No Inhale if needed for wheezing or shortness of breath. 1-2puffs q 4-6hrs Historical MD Cathy Taking Active   albuterol 2.5 mg /3 mL (0.083 %) nebulizer solution [id number] No Take 1 ampule by nebulization every 6 hours if needed for wheezing or shortness of breath. Historical Provider, MD Taking Active   Allergy, diphenhydrAMINE, 25 mg capsule [id number] No Take by mouth. Allergy, diphenhydrAMINE, 25 mg capsule Frank Provider, MD Taking Active   amLODIPine (Norvasc) 10 mg tablet [id number] No Take 1 tablet (10 mg) by mouth once daily. Historical Provider, MD Taking Active   BD Ultra-Fine Micro Pen Needle 32 gauge x 1/4\" needle [id number] No once daily. BD Ultra-Fine Micro Pen Needle 32 gauge x 1/4\" needle Historical Provider, MD Taking Active   carboxymethylcellulose (Refresh Plus) 0.5 % ophthalmic solution [id number] No Administer 1 drop into both eyes if needed for dry eyes. Frank Provider, MD Not Taking Active   diazePAM (Valium) 5 mg tablet [id number] No Take 1 tablet (5 mg) by mouth every 8 hours if needed for anxiety. Max daily dose of 3 " Historical Provider, MD Taking Active   HumaLOG Mix 75-25 KwikPen 100 unit/mL (75-25) injection [id number]  Use up to 26 units twice daily with meals GARRETT Mohamud  Active   hydrALAZINE (Apresoline) 25 mg tablet [id number] No Take 1 tablet (25 mg) by mouth every 6 hours. For hypertension Historical Provider, MD Taking Active   hydroCHLOROthiazide (HYDRODiuril) 25 mg tablet [id number] No Take 1 tablet (25 mg) by mouth once daily. Historical Cathy, MD Taking Active   ibuprofen (IBU) 600 mg tablet [id number] No Take 1 tablet (600 mg) by mouth once daily as needed (pain). Historical MD Cathy Taking Active   lancets (OneTouch Delica Plus Lancet) 30 gauge misc [id number] No  Historical Provider, MD Taking Active   lidocaine (Lidoderm) 5 % patch [id number] No Place on the skin. lidocaine (Lidoderm) 5 % patch Historical Provider, MD Taking Active   OneTouch Verio test strips strip [id number] No 4 times a day. Historical MD Cathy Taking Active   oxyCODONE-acetaminophen (Percocet)  mg tablet [id number] No Take by mouth. Frank Morgan MD Taking Active   oxyCODONE-acetaminophen (Percocet) 5-325 mg tablet [id number] No Take by mouth. oxyCODONE-acetaminophen (Percocet) 5-325 mg tablet Historical Provider, MD Unknown Active   sertraline (Zoloft) 50 mg tablet [id number] No Take by mouth. sertraline (Zoloft) 50 mg tablet Historical MD Cathy Taking Active   triamcinolone (Kenalog) 0.5 % cream [id number] No Apply topically. triamcinolone (Kenalog) 0.5 % cream Frank Morgan MD Not Taking Active   Xarelto 2.5 mg tablet [id number] No Take 1 tablet (2.5 mg) by mouth once daily. Historical Provider, MD Past Week 1000 Active                    RX Allergies[3]    Social History     Socioeconomic History    Marital status:      Spouse name: Not on file    Number of children: Not on file    Years of education: Not on file    Highest education level: Not on file   Occupational History    Not on file    Tobacco Use    Smoking status: Every Day     Current packs/day: 0.50     Average packs/day: 0.5 packs/day for 28.0 years (14.0 ttl pk-yrs)     Types: Cigarettes    Smokeless tobacco: Never   Vaping Use    Vaping status: Never Used   Substance and Sexual Activity    Alcohol use: Yes    Drug use: Not Currently    Sexual activity: Not on file   Other Topics Concern    Not on file   Social History Narrative    Not on file     Social Drivers of Health     Financial Resource Strain: High Risk (12/10/2023)    Overall Financial Resource Strain (CARDIA)     Difficulty of Paying Living Expenses: Very hard   Food Insecurity: Food Insecurity Present (4/24/2025)    Received from [facility]    Hunger Vital Sign     Worried About Running Out of Food in the Last Year: Often true     Ran Out of Food in the Last Year: Often true   Transportation Needs: No Transportation Needs (9/9/2024)    Received from [facility]    PRAPARE - Transportation     Lack of Transportation (Medical): No     Lack of Transportation (Non-Medical): No   Physical Activity: Insufficiently Active (9/9/2024)    Received from [facility]    Exercise Vital Sign     Days of Exercise per Week: 3 days     Minutes of Exercise per Session: 30 min   Stress: Stress Concern Present (9/9/2024)    Received from [facility]    [employer] [place] [employer] - Occupational Stress Questionnaire     Feeling of Stress : Very much   Social Connections: Not on File (9/11/2024)    Received from [facility]    Social Connections     Connectedness: 0   Intimate Partner Violence: Not on file   Housing Stability: Low Risk  (12/10/2023)    Housing Stability Vital Sign     Unable to Pay for Housing in the Last Year: No     Number of Places Lived in the Last Year: 1     Unstable Housing in the Last Year: No       Surgical History[4]    There is no height or weight on file to calculate BMI.    HgA1c:   Lab Results   Component Value Date    HGBA1C 7.7 (A)  02/18/2025       Physical Exam:  Constitutional: Well-developed well-nourished   Eyes: Sclerae anicteric, pupils equal and round  HENT: Normocephalic atraumatic  Cardiovascular: Pulses full, regular rate and rhythm  Respiratory: Breathing not labored, no wheezing  Integumentary: Skin intact, no lesions or rashes  Neurological: Sensation intact, no gross strength deficits, reflexes equal  Psychiatric: Alert oriented and appropriate  Hematologic/lymphatic: No lymphadenopathy  Neck exam normal   Right shoulder: No deformity tender over the tuberosity in the bicipital groove has very limited active or elevation but I can elevate her to 160 passively strength testing is difficult because of her discomfort external rotation to 90 degrees          Imaging:  Plain x-rays show changes about the tuberosity as well inferior glenoid humeral spurring but acromiohumeral interval is maintained        Impression/Plan:  RI imaging of right shoulder determine possible surgical intervention         [1]   Past Medical History:  Diagnosis Date    Anxiety     Bipolar disorder, unspecified (Multi)     Chronic obstructive pulmonary disease, unspecified     History of DVT (deep vein thrombosis) 2019    left leg    History of hepatitis C     treated 2018    HTN (hypertension)     Low back pain, unspecified     Polyneuropathy, unspecified     Type 2 diabetes mellitus without complications (Multi)    [2]   Patient Active Problem List  Diagnosis    Anxiety    Bilateral dry eyes    Bipolar disorder    Chronic lower back pain    Chronic obstructive lung disease (Multi)    Facet syndrome, lumbar    Floppy eyelid syndrome    Hyperopia of both eyes    Insomnia    Left arm pain    Lumbar spondylosis    MGD (meibomian gland disease)    Obesity (BMI 30-39.9)    Otalgia, left    Peripheral polyneuropathy    Senile corneal change    Sensation of plugged ear on left side    Sensory disturbance    Spasm    Type 2 diabetes mellitus    Urinary incontinence     Vitamin D deficiency    Thyroid nodule    Sleep apnea    Rectal pain    Nicotine addiction    Hypermetropia of both eyes    Colon adenomas    Chronic anticoagulation    Change in stool caliber    Asthma    Sepsis due to urinary tract infection (Multi)   [3]   Allergies  Allergen Reactions    Peg 3350-Sod Sulf,Chlr-Pot-Mag Hives    Ace Inhibitors Unknown    Albiglutide Other    Cromolyn Unknown    Hydrocodone-Homatropine Unknown    Insulin Aspart Unknown    Insulin Lispro Itching    Insulin Nph Human Isophane Unknown    Oxybutynin Unknown    Penicillins Hives, Swelling and Unknown    Sulfa (Sulfonamide Antibiotics) Hives, Swelling and Unknown    Ziprasidone Unknown   [4]   Past Surgical History:  Procedure Laterality Date    BUNIONECTOMY      COLONOSCOPY      HERNIA REPAIR      KNEE ARTHROSCOPY W/ DEBRIDEMENT Right 2019    SHOULDER ARTHROSCOPY      TUBAL LIGATION      US GUIDED THYROID BIOPSY  03/07/2022    US GUIDED THYROID BIOPSY 3/7/2022

## 2025-04-30 ENCOUNTER — OFFICE VISIT (OUTPATIENT)
Dept: PAIN MEDICINE | Facility: CLINIC | Age: 70
End: 2025-04-30
Payer: COMMERCIAL

## 2025-04-30 VITALS
TEMPERATURE: 98.1 F | DIASTOLIC BLOOD PRESSURE: 79 MMHG | HEART RATE: 75 BPM | SYSTOLIC BLOOD PRESSURE: 177 MMHG | OXYGEN SATURATION: 96 %

## 2025-04-30 DIAGNOSIS — G89.29 CHRONIC LOW BACK PAIN, UNSPECIFIED BACK PAIN LATERALITY, UNSPECIFIED WHETHER SCIATICA PRESENT: ICD-10-CM

## 2025-04-30 DIAGNOSIS — M47.816 LUMBAR SPONDYLOSIS: Primary | ICD-10-CM

## 2025-04-30 DIAGNOSIS — M54.50 CHRONIC LOW BACK PAIN, UNSPECIFIED BACK PAIN LATERALITY, UNSPECIFIED WHETHER SCIATICA PRESENT: ICD-10-CM

## 2025-04-30 PROCEDURE — 3078F DIAST BP <80 MM HG: CPT

## 2025-04-30 PROCEDURE — 3077F SYST BP >= 140 MM HG: CPT

## 2025-04-30 PROCEDURE — 1125F AMNT PAIN NOTED PAIN PRSNT: CPT

## 2025-04-30 PROCEDURE — 99213 OFFICE O/P EST LOW 20 MIN: CPT

## 2025-04-30 PROCEDURE — 3051F HG A1C>EQUAL 7.0%<8.0%: CPT

## 2025-04-30 PROCEDURE — 1159F MED LIST DOCD IN RCRD: CPT

## 2025-04-30 RX ORDER — TIZANIDINE 4 MG/1
4 TABLET ORAL EVERY 6 HOURS PRN
Qty: 30 TABLET | Refills: 0 | Status: SHIPPED | OUTPATIENT
Start: 2025-04-30 | End: 2025-05-10

## 2025-04-30 ASSESSMENT — PAIN SCALES - GENERAL: PAINLEVEL_OUTOF10: 7

## 2025-04-30 NOTE — PROGRESS NOTES
Subjective   Patient ID: Nahomi Saavedra is a 70 y.o. female who presents for Pain.  HPI    69 yo female presents today bilateral leg pain and low back pain. She is reporting pain in the low back that travels down the bilateral legs. Reports feeling of spasm and weakness in the legs. Pain is worse with walking. Pain is rated 7/10 in the legs. She is prescribed oxycodone- acetaminophen 7.5 twice daily by Dr. Callejas. States that she will very rarely take ibuprofen for severe pain. She is taking Xarelto. She is also taking cyclobenzaprine. States that the cyclobenzaprine is not helping, states that the supply she got from the pharmacy was different and not helping anymore. Had accupuncture and physical therapy for her low back in May 2024. Patient states that she had diagnostic facet last year, we do not have record of this. Did not receive RFA. She does not want any steroid injections due to her blood sugar being impacted.       Review of Systems  All 13 systems were reviewed and are within normal levels except as noted below or per HPI. Positive and pertinent negative responses are noted below or in the HPI   Denied any fever or chills. No weight loss and no night sweats. No cough or sputum production. No diarrhea   Denies constipation.   No bladder and bowel incontinence and no other changes in bladder and bowel. No skin changes. Reports tiredness and fatigability only if the pain is not controlled.   Denied opioids diversion and abuse and denies alcoholism. Denies overuse of the pain medications.      Objective   Physical Exam  General   Alert and oriented x4, pleasant and cooperative.      HEENT  Pupils are equal and normal in size. Ears, nose, mouth, and throat appear to be WNL.  Head atraumatic, symmetric.      No signs of sedation or signs of withdrawal apparent.     Psychiatric   No signs of depression apparent. Appropriate mood and affect.     Neuro   No focal neurological deficit apparent. Ambulation at  baseline. Normal visual inspection of the lumbar spine. Lumbar paraspinal tender to palpation. Bilateral SI joints nontender to palpation. Normal strength and sensation in the bilateral lower extremities.      Respiratory  No respiratory distress, respirations equal and unlabored.     Abdomen  Soft and nontender, no distention noted.     Skin  Warm, dry and intact. No skin markings supportive of recent IV drug usage .            Study Result    Narrative & Impression   Interpreted By:  Daquan Ivory,   STUDY:  XR LUMBAR SPINE COMPLETE 4+ VIEWS      INDICATION:  Signs/Symptoms:persistent back pain.      COMPARISON:  None      ACCESSION NUMBER(S):  [id number]      ORDERING CLINICIAN:  TK CARRERA      FINDINGS:  Moderate diffuse lumbar degenerative changes at all levels.      Advanced facet arthrosis at L4-5 leads to a 1 cm anterolisthesis.      No evidence of fracture or lesion.      IMPRESSION:  Moderate diffuse lumbar degenerative changes at all levels.      Advanced facet arthrosis at L4-5 leads to a 1 cm anterolisthesis.      Signed by: Daquan Ivory 2/2/2024 5:52 PM  Dictation workstation:   [id number]       Assessment/Plan     69 yo female with chronic low back pain associated with lumbar spondylosis, facet arthrosis seen on X-Ray.     Scheduled diagnostic medial nerve branch block bilateral L4- L5, L5- S1 under fluoroscopic guidance. She will need to hold the Xarelto prior to the procedure, we will need sign off from prescriber to hold the medication.     Stop cyclobenzaprine. We will trial tizanidine as needed for muscle spasms.     Follow up as needed.     Explained plan to this patient, and patient verbalized understanding and agreement with the plan.     Please do not hesitate to contact the pain clinic after your visit with any questions or concerns at  M-F 8-4 pm     Patient was reminded not to share medications, not to take prescription medications that were not prescribed to the  patient, and not to increase or change dose without consulting the pain clinic. I advised the patient to always take the least amount of medication needed to keep symptoms under control.          Jovanna Qureshi, DESTINY-CNP 04/30/25 10:28 AM

## 2025-05-07 ENCOUNTER — APPOINTMENT (OUTPATIENT)
Dept: ENDOCRINOLOGY | Facility: CLINIC | Age: 70
End: 2025-05-07
Payer: COMMERCIAL

## 2025-05-07 VITALS — HEIGHT: 64 IN | WEIGHT: 233 LBS | BODY MASS INDEX: 39.78 KG/M2

## 2025-05-07 DIAGNOSIS — E11.9 TYPE 2 DIABETES MELLITUS WITHOUT COMPLICATION, WITH LONG-TERM CURRENT USE OF INSULIN: Primary | ICD-10-CM

## 2025-05-07 DIAGNOSIS — Z79.4 TYPE 2 DIABETES MELLITUS WITHOUT COMPLICATION, WITH LONG-TERM CURRENT USE OF INSULIN: Primary | ICD-10-CM

## 2025-05-07 DIAGNOSIS — Z71.3 DIETARY COUNSELING: ICD-10-CM

## 2025-05-07 NOTE — PATIENT INSTRUCTIONS
- Can follow the healthy plate at meal times to assist with portion control as well as to provide well balanced meals.  - Healthy Plate: Use a 9-inch diameter plate and aim for ½ plate non-starchy vegetables, ¼ plate lean protein, and ¼ plate complex carbohydrates.  - Have a goal of consuming a lean source of protein at meals to assist in providing better satiety and better blood sugar control as discussed. Tuna and canned chicken are wonderful sources!   - Aim for no more than 45 grams of carbohydrate at main meal times and no more than 15 grams of carbohydrate at a snack time.    - Choose foods high in fiber such as whole grain breads, cereals, pasta, brown rice, fruits, and vegetables. These foods are digested more slowly, which helps to better control blood sugar levels as well as provide better satiety.  - Choose foods with less sugar and fats and avoid high calorie snack foods and baked goods such as chips, cakes, cookies, etc. most often.  - Recipes to help with incorporating healthy balanced meals can be found at: https://diabetesfoodhub.org/recipes  - Choose sugar-free, calorie-free beverages and aim for 64 ounces of water daily.  - Aim for 150 minutes of moderate physical activity weekly and within this 150 minutes, aim to incorporate 2 times weekly of resistance training.  - Continue to check blood sugar levels regularly as discussed to determine correlation between food choices and amounts of blood sugar rise.    - Follow-up with nutrition 4-6 weeks.

## 2025-05-07 NOTE — PROGRESS NOTES
Follow-up Nutrition Assessment    Interval History: Patient presents for follow-up nutrition appointment for weight management/desire to lose weight, as well as for consideration for Type 2 diabetes. Since last nutrition visit on 4/1/2025, pt has not weighed herself. Diet recall reveals Diet recall reveals an inconsistent meal pattern with frequently missed meals, as well as reported intakes of larger portions and calorically dense foods all likely contributing to lack of desired weight loss/contributing to weight gain over time. Fluids meeting recommendations in type and amount with water as primary beverage. Pt is not incorporating consistent physical activity at this time and would likely benefit from increased structured activity to assist in achieving goals. .    Nutrition Interventions/Recommendations from last visit scheduled on 4/1/2025:  - Please refer to your book entitled: Your Mediterranean Meal Plan, and follow Mediterranean Diet eating guidelines as reviewed.  - The Healthy Plate style of eating can be a helpful tool for incorporating healthy balanced meals in appropriate portions. (Healthy Plate: Start with a 9-inch diameter plate. Fill 1/2 the plate with non-starchy vegetables, 1/4 of the plate with whole grains or starchy vegetables, and 1/4 of the plate with a lean source of protein.   - Please aim for a source of healthy protein and fiber rich foods at meals as discussed for nutrition needs as well as to help provide better satiety at meals.   - Consider pre-planning healthy meals for the week. Refer to your book for both menu and recipe ideas to get you started.  - Incorporate strategies of mindful eating every day. Practice staying in tune with your body's hunger cues and eat only when truly hungry. Avoid emotional eating/eating when not hungry.  - Continue to aim for 64 ounces of water daily.  -Exercise as tolerated, start by walking for five minutes and slowly increase.   - Follow-up as  "scheduled with GARRETT Chopra  - Follow-up with nutrition in 4-6 weeks.      Adherence to recommendations and patient stated goals: No    Anthropometrics:  Height:   Ht Readings from Last 1 Encounters:   04/01/25 1.626 m (5' 4\")      Weight:   Wt Readings from Last 10 Encounters:   04/01/25 106 kg (233 lb 12.8 oz)   02/18/25 107 kg (236 lb 3.2 oz)   06/05/24 99.8 kg (220 lb 0.3 oz)   03/14/24 99.8 kg (220 lb)   03/04/24 99.8 kg (220 lb)   02/21/24 103 kg (227 lb 11.8 oz)   12/09/23 104 kg (228 lb 6.3 oz)   12/07/23 104 kg (230 lb)   12/06/23 104 kg (230 lb)   11/06/23 105 kg (231 lb 7.7 oz)      Current BMI:   BMI Readings from Last 1 Encounters:   04/01/25 40.13 kg/m²        Malnutrition Screening:  Significant Unintentional weight loss: No  Eating less than 75% of usual intake for more than 2 weeks: No  Potential Signs of Inflammation: No    Recommended Malnutrition Diagnosis: No malnutrition identified    Diet Recall-  Breakfast- Grits and a scrambled egg with applesauce OR oatmeal OR 1/2 protein shake with frozen fruit   Lunch- Pb& J on whole wheat bread   Dinner- 1/2 baked potato, chicken, okra   Daily Snacks- Cheese, crackers, fruit, turkey, celery with cream cheese or PB and celery, nuts, candy bar or ice cream   Beverages- 2 oz OJ, water 4-5 bottles of water, coffee sometimes   Alcohol- social/rare      Physical Activity- none    Other pertinent patient reported Information:  - Pt reports that she has not made many changes since our last session but that she is doing her best on a fixed income. Continues to  foods from the food pantry.   -Reports that fasting glucose of 110-125 mg/dL more morning. Will forget to check sugar after meals or when she does check, it is 220-240 mg/dL. She reports that in the afternoon she will be very itcy and thinks it is related to her insulin and is going to follow up with prescribing MD.    -States that if she sees her sugar is 110-120 mg/dL and is " asymptomatic, she treats this as a low and will eat a Pb&J sandwich. Edu provided by this RD on pt possibly overcorrecting.       Nutrition Diagnosis: Overweight/Obesity related to food-and-nutrition-related knowledge deficit and physical inactivity as evidenced by BMI above normative standard for age and gender.    Readiness to Learn:  Cognitive ability: Alert and oriented  Motivation to learn: Eager and Interested  Family Support: Unable to assess- family not present  Instruction provided to: Patient  Patient learns best by: Multiple methods  Factors affecting learning: None   Physical limitations affecting learning: None    Education Materials Provided:   None -Patient decline Carbohydrate Counting for Diabetes     Nutrition Interventions/Recommendations for 5/7/2025:  - Can follow the healthy plate at meal times to assist with portion control as well as to provide well balanced meals.  - Healthy Plate: Use a 9-inch diameter plate and aim for ½ plate non-starchy vegetables, ¼ plate lean protein, and ¼ plate complex carbohydrates.  - Have a goal of consuming a lean source of protein at meals to assist in providing better satiety and better blood sugar control as discussed. Tuna and canned chicken are wonderful sources!   - Aim for no more than 45 grams of carbohydrate at main meal times and no more than 15 grams of carbohydrate at a snack time.    - Choose foods high in fiber such as whole grain breads, cereals, pasta, brown rice, fruits, and vegetables. These foods are digested more slowly, which helps to better control blood sugar levels as well as provide better satiety.  - Choose foods with less sugar and fats and avoid high calorie snack foods and baked goods such as chips, cakes, cookies, etc. most often.  - Recipes to help with incorporating healthy balanced meals can be found at: https://diabetesfoodhub.org/recipes  - Choose sugar-free, calorie-free beverages and aim for 64 ounces of water daily.  - Aim  for 150 minutes of moderate physical activity weekly and within this 150 minutes, aim to incorporate 2 times weekly of resistance training.  - Continue to check blood sugar levels regularly as discussed to determine correlation between food choices and amounts of blood sugar rise.    - Follow-up with nutrition 4-6 weeks.     Nutrition Monitoring & Evaluation: adherence to recommendations and patient stated goals    Need for follow-up: Individual Nutrition Visit in 4-6 weeks    Referred by: DESTINY Chopra-CNP    MNT Billing Type: Medical Nutrition Re-Assessment, each 15 min increment, for 3 increments.    SIGNATURE:   Viktoriya Perla MS, RD, LD                                                      DATE:   5/7/2025

## 2025-05-14 ENCOUNTER — HOSPITAL ENCOUNTER (OUTPATIENT)
Dept: RADIOLOGY | Facility: CLINIC | Age: 70
Discharge: HOME | End: 2025-05-14
Payer: COMMERCIAL

## 2025-05-14 DIAGNOSIS — M25.511 RIGHT SHOULDER PAIN, UNSPECIFIED CHRONICITY: ICD-10-CM

## 2025-05-14 PROCEDURE — 73221 MRI JOINT UPR EXTREM W/O DYE: CPT | Mod: RT

## 2025-05-14 PROCEDURE — 73221 MRI JOINT UPR EXTREM W/O DYE: CPT | Mod: RIGHT SIDE | Performed by: RADIOLOGY

## 2025-05-20 ENCOUNTER — HOSPITAL ENCOUNTER (OUTPATIENT)
Dept: PAIN MEDICINE | Facility: CLINIC | Age: 70
Discharge: HOME | End: 2025-05-20
Payer: COMMERCIAL

## 2025-05-20 VITALS
DIASTOLIC BLOOD PRESSURE: 64 MMHG | TEMPERATURE: 97.5 F | SYSTOLIC BLOOD PRESSURE: 144 MMHG | RESPIRATION RATE: 18 BRPM | HEART RATE: 68 BPM | OXYGEN SATURATION: 97 %

## 2025-05-20 DIAGNOSIS — G89.29 CHRONIC LOW BACK PAIN, UNSPECIFIED BACK PAIN LATERALITY, UNSPECIFIED WHETHER SCIATICA PRESENT: ICD-10-CM

## 2025-05-20 DIAGNOSIS — M54.50 CHRONIC LOW BACK PAIN, UNSPECIFIED BACK PAIN LATERALITY, UNSPECIFIED WHETHER SCIATICA PRESENT: ICD-10-CM

## 2025-05-20 DIAGNOSIS — M47.816 LUMBAR SPONDYLOSIS: Primary | ICD-10-CM

## 2025-05-20 PROCEDURE — 2500000004 HC RX 250 GENERAL PHARMACY W/ HCPCS (ALT 636 FOR OP/ED): Mod: JZ | Performed by: PAIN MEDICINE

## 2025-05-20 PROCEDURE — 64493 INJ PARAVERT F JNT L/S 1 LEV: CPT | Mod: 50 | Performed by: PAIN MEDICINE

## 2025-05-20 PROCEDURE — 64494 INJ PARAVERT F JNT L/S 2 LEV: CPT | Mod: 50 | Performed by: PAIN MEDICINE

## 2025-05-20 RX ORDER — LIDOCAINE HYDROCHLORIDE 10 MG/ML
INJECTION, SOLUTION EPIDURAL; INFILTRATION; INTRACAUDAL; PERINEURAL AS NEEDED
Status: COMPLETED | OUTPATIENT
Start: 2025-05-20 | End: 2025-05-20

## 2025-05-20 RX ORDER — LIDOCAINE HYDROCHLORIDE 20 MG/ML
INJECTION, SOLUTION EPIDURAL; INFILTRATION; INTRACAUDAL; PERINEURAL AS NEEDED
Status: COMPLETED | OUTPATIENT
Start: 2025-05-20 | End: 2025-05-20

## 2025-05-20 RX ADMIN — LIDOCAINE HYDROCHLORIDE 10 ML: 10 INJECTION, SOLUTION EPIDURAL; INFILTRATION; INTRACAUDAL; PERINEURAL at 13:42

## 2025-05-20 RX ADMIN — LIDOCAINE HYDROCHLORIDE 10 ML: 20 INJECTION, SOLUTION EPIDURAL; INFILTRATION; INTRACAUDAL at 13:42

## 2025-05-20 ASSESSMENT — PAIN DESCRIPTION - DESCRIPTORS: DESCRIPTORS: ACHING;BURNING

## 2025-05-20 ASSESSMENT — PAIN SCALES - GENERAL: PAINLEVEL_OUTOF10: 8

## 2025-05-20 ASSESSMENT — PAIN - FUNCTIONAL ASSESSMENT: PAIN_FUNCTIONAL_ASSESSMENT: 0-10

## 2025-05-20 NOTE — DISCHARGE INSTRUCTIONS
Post-injection instructions FOR FACET BLOCK      Pay attention to how much pain relief (what percentage compared to before the procedure) you get and for how long it lasts.     THIS IS A TEMPORARY NUMBING OF PAIN THIS IS BEING USED AS A DIAGNOSTIC INDICATOR IF THIS IS THE SOURCE OF YOUR PAIN    Activity:  RETURN TO NORMAL ACTIVITY  SEE IF YOU CAN APPRECIATE AN IMPROVEMENT IN THE PAIN    Bandages: Remove after 24 hours     Showering/Bathing: You may shower after bandage is removed     Follow up: CALL OFFICE NEXT BUSINESS [phone] LEAVE MESSAGE ABOUT THE % OF RELIEF THAT WAS OBTAINED AND FOR HOW MANY HOURS      Call the OFFICE immediately: if you notice:     Excessive bleeding from procedure site (brisk bright red bleeding from the site or bleeding that soaks the bandages or does not stop)   Severe headache  Inability to walk, leg or arm weakness or numbness that is worse after the procedure   Uncontrolled pain   New urinary or fecal incontinence   Signs of infection: Fever above 101.5F, redness, swelling, pus or drainage from the site

## 2025-05-22 ENCOUNTER — TELEPHONE (OUTPATIENT)
Dept: PAIN MEDICINE | Facility: CLINIC | Age: 70
End: 2025-05-22
Payer: COMMERCIAL

## 2025-05-28 ENCOUNTER — PREP FOR PROCEDURE (OUTPATIENT)
Dept: ORTHOPEDIC SURGERY | Facility: HOSPITAL | Age: 70
End: 2025-05-28

## 2025-05-28 ENCOUNTER — HOSPITAL ENCOUNTER (OUTPATIENT)
Facility: HOSPITAL | Age: 70
Setting detail: OUTPATIENT SURGERY
End: 2025-05-28
Attending: STUDENT IN AN ORGANIZED HEALTH CARE EDUCATION/TRAINING PROGRAM | Admitting: STUDENT IN AN ORGANIZED HEALTH CARE EDUCATION/TRAINING PROGRAM
Payer: COMMERCIAL

## 2025-05-28 ENCOUNTER — OFFICE VISIT (OUTPATIENT)
Dept: ORTHOPEDIC SURGERY | Facility: CLINIC | Age: 70
End: 2025-05-28
Payer: COMMERCIAL

## 2025-05-28 DIAGNOSIS — M25.511 RIGHT SHOULDER PAIN, UNSPECIFIED CHRONICITY: ICD-10-CM

## 2025-05-28 DIAGNOSIS — M75.121 COMPLETE TEAR OF RIGHT ROTATOR CUFF, UNSPECIFIED WHETHER TRAUMATIC: Primary | ICD-10-CM

## 2025-05-28 DIAGNOSIS — M75.121 COMPLETE TEAR OF RIGHT ROTATOR CUFF, UNSPECIFIED WHETHER TRAUMATIC: ICD-10-CM

## 2025-05-28 DIAGNOSIS — M25.511 RIGHT SHOULDER PAIN, UNSPECIFIED CHRONICITY: Primary | ICD-10-CM

## 2025-05-28 PROCEDURE — L3670 SO ACRO/CLAV CAN WEB PRE OTS: HCPCS | Performed by: STUDENT IN AN ORGANIZED HEALTH CARE EDUCATION/TRAINING PROGRAM

## 2025-05-28 PROCEDURE — 99214 OFFICE O/P EST MOD 30 MIN: CPT | Performed by: STUDENT IN AN ORGANIZED HEALTH CARE EDUCATION/TRAINING PROGRAM

## 2025-05-28 PROCEDURE — 3051F HG A1C>EQUAL 7.0%<8.0%: CPT | Performed by: STUDENT IN AN ORGANIZED HEALTH CARE EDUCATION/TRAINING PROGRAM

## 2025-05-28 PROCEDURE — 1125F AMNT PAIN NOTED PAIN PRSNT: CPT | Performed by: STUDENT IN AN ORGANIZED HEALTH CARE EDUCATION/TRAINING PROGRAM

## 2025-05-28 PROCEDURE — 1159F MED LIST DOCD IN RCRD: CPT | Performed by: STUDENT IN AN ORGANIZED HEALTH CARE EDUCATION/TRAINING PROGRAM

## 2025-05-28 RX ORDER — CEFAZOLIN SODIUM 2 G/100ML
2 INJECTION, SOLUTION INTRAVENOUS ONCE
OUTPATIENT
Start: 2025-05-28 | End: 2025-05-28

## 2025-05-28 ASSESSMENT — PAIN SCALES - GENERAL: PAINLEVEL_OUTOF10: 3

## 2025-05-28 ASSESSMENT — PAIN - FUNCTIONAL ASSESSMENT: PAIN_FUNCTIONAL_ASSESSMENT: 0-10

## 2025-05-28 ASSESSMENT — PAIN DESCRIPTION - DESCRIPTORS: DESCRIPTORS: ACHING

## 2025-05-28 NOTE — PROGRESS NOTES
CHIEF COMPLAINT: No chief complaint on file.    History: 70 y.o. female presents to the office today for evaluation of her bilateral shoulders.  The patient has been having ongoing bilateral shoulder pain for many years now.  She has had extensive conservative management including acupuncture, multiple cortisone shots, most recent of which was last year, as well as more than 6 weeks of physical therapy on both shoulders.  Unfortunately, she continues have significant pain and weakness with both shoulders.  Left side is more painful than the right and she is left-hand dominant.  Here today to discuss surgical options.    Past medical history, past surgical history, medications, allergies, family history, social history, and review of systems were reviewed today.    A 12 point review of systems was negative other than as stated in the HPI.    Medical History[1]     Allergies[2]     Surgical History[3]     Family History[4]     Social History     Socioeconomic History    Marital status:      Spouse name: Not on file    Number of children: Not on file    Years of education: Not on file    Highest education level: Not on file   Occupational History    Not on file   Tobacco Use    Smoking status: Every Day     Current packs/day: 0.50     Average packs/day: 0.5 packs/day for 28.0 years (14.0 ttl pk-yrs)     Types: Cigarettes    Smokeless tobacco: Never   Vaping Use    Vaping status: Never Used   Substance and Sexual Activity    Alcohol use: Yes    Drug use: Not Currently    Sexual activity: Not on file   Other Topics Concern    Not on file   Social History Narrative    Not on file     Social Drivers of Health     Financial Resource Strain: At Risk (5/9/2025)    Received from [facility]    Financial Resource Strain     Financial Resource Strain: 2   Food Insecurity: Food Insecurity Present (4/24/2025)    Received from [facility]    Hunger Vital Sign     Worried About Running Out of Food in the Last Year: Often true  "    Ran Out of Food in the Last Year: Often true   Transportation Needs: Not at Risk (2025)    Received from [facility]    Transportation Needs     Transportation: 1   Physical Activity: Insufficiently Active (2024)    Received from [facility]    Exercise Vital Sign     Days of Exercise per Week: 3 days     Minutes of Exercise per Session: 30 min   Stress: Stress Concern Present (2024)    Received from [facility]    [employer] - Occupational Stress Questionnaire     Feeling of Stress : Very much   Social Connections: Not at Risk (2025)    Received from [facility]    Social Connections     Connectedness: 1   Intimate Partner Violence: Not on file   Housing Stability: Not at Risk (2025)    Received from [facility]    Housing Stability     Housin        CURRENT MEDICATIONS:   Current Medications[5]    Physical Examination:      10/30/2024    12:00 AM 2025     2:35 PM 2025     1:45 PM 2025    10:22 AM 2025    10:15 AM 2025    12:57 PM 2025     1:48 PM   Vitals   Systolic 180 146  177  146 144   Diastolic 85 75  79  67 64   BP Location  Left arm        Heart Rate 74 92  75  94 68   Temp  37.1 °C (98.8 °F)  36.7 °C (98.1 °F)  36.4 °C (97.5 °F)    Resp 18 22    18 18   Height  1.626 m (5' 4\") 1.626 m (5' 4\")  1.626 m (5' 4\")     Weight (lb)  236.2 233.8  233     BMI  40.54 kg/m2 40.13 kg/m2  39.99 kg/m2     BSA (m2)  2.2 m2 2.19 m2  2.19 m2     Visit Report  Report  Report         There is no height or weight on file to calculate BMI.    Well-appearing, appears stated age, pleasant and cooperative, appropriate mood and behavior. Height and weight reviewed. Alert and oriented x3.  Auditory function intact.  No acute distress.  Intact ocular function, MARCEL, EOMI. Breathing is unlabored .  There is no evidence of jugular venous distension. Skin appearance is normal without evidence of rash or other lesions. 2+ radial pulses bilaterally, fingers " pink and wwp, good capillary refill, no pitting edema. No appreciable lymphadenopathy in bilateral upper extremities. SILT throughout both upper extremities, median/radial/ulnar/musculocutaneous/axillary nerve motor and sensory intact (except for abnormalities noted in focused musculoskeletal exam section below).     Neck exam: Full range of motion of the neck in flexion/extension and rotational movements. No significant areas of tenderness to palpation in the neck.    On exam of bilateral upper extremities, extremely limited range of motion of both shoulders.  Active forward flexion to 90, external Tatian of 10, to rotation at the side.  Passively can get a bit more range of motion but is quite painful for her.  Severe weakness of rotator cuff strength testing bilaterally, 3+ out of 5 supraspinatus strength, 4 out of 5 external rotation strength.    Imaging: Radiographs and MRIs of the bilateral shoulders were reviewed today.  Personally interpreted by myself.  MRI of the right shoulder reveals a massive, retracted rotator cuff tear with early atrophy of the supraspinatus.  There is also degenerative changes of the glenohumeral joint as well as sclerosis of the greater tuberosity.  There is an MRI of the left shoulder from 2023 that shows a massive, retracted rotator cuff tear of the left side as well.    Assessment: Bilateral, massive, irreparable rotator cuff tears    Plan: We had extensive discussion with the patient with treatment options diagnosis.  She does have bilateral shoulder pain in the setting of massive, irreparable rotator cuff tears of both shoulders.  She has had extensive conservative management including acupuncture, multiple cortisone injections, as well as physical therapy.  At this point, the shoulder is extremely dysfunctional and painful.  I discussed with her that given her radiographic and MRI findings I do think the most reliable option would be a reverse shoulder replacement.  Discussed  that repair of the rotator cuff in the setting of massive, retracted tears with early atrophy does have a very low healing rate.  Therefore I do think the reverse shoulder replacement is more reliable in her situation.  She would like to proceed with this on the right side.  She would like to stay overnight.    The patient has failed conservative management. At this point, the patient is a candidate for surgery.  Patient is in agreement with this.  Risks and recovery after surgery were discussed.  The proposed procedure will be a reverse shoulder arthroplasty.  Risks of surgery include bleeding, infection, instability, neurovascular injury, persistent pain, implant failure, wound complications and possible need for further surgery. All surgeries that are performed under anesthesia also have the risks of DVTs, pulmonary embolism, potentially death. Per anesthesia's evaluation, the patient will have a nerve block, the risks of which the anesthesia team will discuss with the patient.   Patient voiced understanding of these risks and wished to proceed. Postoperative recovery involves being in a sling in the early phases, with progressive increases in range of motion and strengthening exercises over a period of a few months.  We did discuss that reverse shoulder replacement has an extensive recovery, usually around 3-4 months until they are getting their strength back and probably feeling around 75% at that time. It is really 1 year until their arm is feeling completely better.  We also discussed that it is common after a reverse shoulder replacement to have some limitations with reaching behind the back as well as having a chronic mild pain (around 1 out of 10) in the shoulder.  We also discussed that with this replacement, they likely will not achieve the full range of motion of the shoulder they have when they were younger, but the range of motion should be significantly improved for them to be able to reach overhead  and perform daily activities.    The patient will need PATs which will also include a CBC, BMP, PT/INR and a full work up by the PAT team as well as anesthesia evaluation.  My office will work to get this scheduled. I will see them back 2 weeks after surgery.    Patient was prescribed a shoulder sling for postoperative care. The patient will have weakness, instability and/or deformity of their (right/left) arm which requires stabilization from this orthosis to improve their function after surgery. Verbal and written instructions for the use, wear schedule, cleaning and application of this item were given. Patient was instructed that should the brace result in increased pain, decreased sensation, increased swelling, or an overall worsening of their medical condition, to please contact our office immediately. Orthotic management and training was provided for skin care, modifications due to healing tissues, edema changes, interruption in skin integrity, and safety precautions with the orthosis.      Dragon software was used to dictate this note, please be aware that minor errors in transcription may be present.    Huey Negrete MD    Shoulder/Elbow Surgery  [facility]            [1]   Past Medical History:  Diagnosis Date    Anxiety     Bipolar disorder, unspecified (Multi)     Chronic obstructive pulmonary disease, unspecified     History of DVT (deep vein thrombosis) 2019    left leg    History of hepatitis C     treated 2018    HTN (hypertension)     Low back pain, unspecified     Polyneuropathy, unspecified     Type 2 diabetes mellitus without complications (Multi)    [2]   Allergies  Allergen Reactions    Peg 3350-Sod Sulf,Chlr-Pot-Mag Hives    Ace Inhibitors Unknown    Albiglutide Other    Cromolyn Unknown    Hydrocodone-Homatropine Unknown    Insulin Aspart Unknown    Insulin Lispro Itching    Insulin Nph Human Isophane Unknown    Oxybutynin  "Unknown    Penicillins Hives, Swelling and Unknown    Sulfa (Sulfonamide Antibiotics) Hives, Swelling and Unknown    Ziprasidone Unknown   [3]   Past Surgical History:  Procedure Laterality Date    BUNIONECTOMY      COLONOSCOPY      HERNIA REPAIR      KNEE ARTHROSCOPY W/ DEBRIDEMENT Right 2019    SHOULDER ARTHROSCOPY      TUBAL LIGATION      US GUIDED THYROID BIOPSY  03/07/2022    US GUIDED THYROID BIOPSY 3/7/2022   [4]   Family History  Problem Relation Name Age of Onset    Depression Mother      Diabetes Mother      Other (essential hypertension) Mother      Cancer Mother     [5]   Current Outpatient Medications   Medication Sig Dispense Refill    albuterol (Ventolin HFA) 90 mcg/actuation inhaler Inhale if needed for wheezing or shortness of breath. 1-2puffs q 4-6hrs      albuterol 2.5 mg /3 mL (0.083 %) nebulizer solution Take 1 ampule by nebulization every 6 hours if needed for wheezing or shortness of breath.      Allergy, diphenhydrAMINE, 25 mg capsule Take by mouth. Allergy, diphenhydrAMINE, 25 mg capsule      amLODIPine (Norvasc) 10 mg tablet Take 1 tablet (10 mg) by mouth once daily.      BD Ultra-Fine Micro Pen Needle 32 gauge x 1/4\" needle once daily. BD Ultra-Fine Micro Pen Needle 32 gauge x 1/4\" needle      diazePAM (Valium) 5 mg tablet Take 1 tablet (5 mg) by mouth every 8 hours if needed for anxiety. Max daily dose of 3      HumaLOG Mix 75-25 KwikPen 100 unit/mL (75-25) injection Use up to 26 units twice daily with meals 60 mL 1    hydroCHLOROthiazide (HYDRODiuril) 25 mg tablet Take 1 tablet (25 mg) by mouth once daily.      ibuprofen (IBU) 600 mg tablet Take 1 tablet (600 mg) by mouth once daily as needed (pain).      lancets (OneTouch Delica Plus Lancet) 30 gauge misc       lidocaine (Lidoderm) 5 % patch Place on the skin. lidocaine (Lidoderm) 5 % patch      OneTouch Verio test strips strip 4 times a day.      oxyCODONE-acetaminophen (Percocet)  mg tablet Take by mouth.      sertraline " (Zoloft) 50 mg tablet Take by mouth. sertraline (Zoloft) 50 mg tablet      tiZANidine (Zanaflex) 4 mg tablet Take 1 tablet (4 mg) by mouth every 6 hours if needed for muscle spasms for up to 10 days. 30 tablet 0    triamcinolone (Kenalog) 0.5 % cream Apply topically. triamcinolone (Kenalog) 0.5 % cream      Xarelto 2.5 mg tablet Take 1 tablet (2.5 mg) by mouth once daily.       No current facility-administered medications for this visit.

## 2025-05-30 ENCOUNTER — CLINICAL SUPPORT (OUTPATIENT)
Dept: PREADMISSION TESTING | Facility: HOSPITAL | Age: 70
End: 2025-05-30
Payer: COMMERCIAL

## 2025-05-30 RX ORDER — CHOLECALCIFEROL (VITAMIN D3) 50 MCG
50 TABLET ORAL DAILY
COMMUNITY

## 2025-05-30 RX ORDER — CETIRIZINE HYDROCHLORIDE 10 MG/1
10 TABLET ORAL DAILY
COMMUNITY

## 2025-05-30 NOTE — CPM/PAT NURSE NOTE
"CPM/PAT Nurse Note      Name: Nahomi Saavedra (Nahomi Saavedra)  /Age: 1955/70 y.o.       Medical History[1]    Surgical History[2]    Patient  has no history on file for sexual activity.    Family History[3]    Allergies[4]    Prior to Admission medications    Medication Sig Start Date End Date Taking? Authorizing Provider   albuterol (Ventolin HFA) 90 mcg/actuation inhaler Inhale if needed for wheezing or shortness of breath. 1-2puffs q 4-6hrs    Historical Provider, MD   albuterol 2.5 mg /3 mL (0.083 %) nebulizer solution Take 1 ampule by nebulization every 6 hours if needed for wheezing or shortness of breath.    Historical Provider, MD   Allergy, diphenhydrAMINE, 25 mg capsule Take by mouth. Allergy, diphenhydrAMINE, 25 mg capsule 23   Historical Provider, MD   amLODIPine (Norvasc) 10 mg tablet Take 1 tablet (10 mg) by mouth once daily.    Historical Provider, MD   B.animalis,bifid,infantis,long (PROBIOTIC 4X ORAL) Take by mouth.    Historical Provider, MD   BD Ultra-Fine Micro Pen Needle 32 gauge x 1/4\" needle once daily. BD Ultra-Fine Micro Pen Needle 32 gauge x 1/4\" needle 22   Historical Provider, MD   cetirizine (ZyrTEC) 10 mg tablet Take 1 tablet (10 mg) by mouth once daily.    Historical Provider, MD   cholecalciferol (Vitamin D3) 50 mcg (2,000 units) tablet Take 1 tablet (50 mcg) by mouth once daily.    Historical Provider, MD   diazePAM (Valium) 5 mg tablet Take 1 tablet (5 mg) by mouth every 8 hours if needed for anxiety. Max daily dose of 3 22   Historical Provider, MD   HumaLOG Mix 75-25 KwikPen 100 unit/mL (75-25) injection Use up to 26 units twice daily with meals 3/4/24   GARRETT Mohamud   hydroCHLOROthiazide (HYDRODiuril) 25 mg tablet Take 1 tablet (25 mg) by mouth once daily. 23   Historical Provider, MD   ibuprofen (IBU) 600 mg tablet Take 1 tablet (600 mg) by mouth once daily as needed (pain). 3/2/20   Historical Provider, MD   lancets (OneTouch Delica " Plus Lancet) 30 gauge [employer]     Historical Provider, MD   lidocaine (Lidoderm) 5 % patch Place on the skin if needed. lidocaine (Lidoderm) 5 % patch    Historical Provider, MD   OneTouch Verio test strips strip 4 times a day. 12/2/22   Historical Provider, MD   oxyCODONE-acetaminophen (Percocet)  mg tablet Take by mouth if needed.    Historical Provider, MD   sertraline (Zoloft) 50 mg tablet Take by mouth. sertraline (Zoloft) 50 mg tablet 2/6/17   Historical Provider, MD   tiZANidine (Zanaflex) 4 mg tablet Take 1 tablet (4 mg) by mouth every 6 hours if needed for muscle spasms for up to 10 days. 4/30/25 5/10/25  DESTINY Blanc-CNP   triamcinolone (Kenalog) 0.5 % cream Apply topically if needed. triamcinolone (Kenalog) 0.5 % cream    Historical Provider, MD   Xarelto 2.5 mg tablet Take 1 tablet (2.5 mg) by mouth once daily. 12/26/22   Historical Provider, MD LÓPEZ MORAES     DASI Risk Score      Flowsheet Row Questionnaire Series Submission from 5/28/2025 in [facility] OR with Generic Provider Chrystal   Can you take care of yourself (eat, dress, bathe, or use toilet)?  2.75  filed at 05/28/2025 1827   Can you walk indoors, such as around your house? 1.75  filed at 05/28/2025 1827   Can you walk a block or two on level ground?  0  filed at 05/28/2025 1827   Can you climb a flight of stairs or walk up a hill? 0  filed at 05/28/2025 1827   Can you run a short distance? 8  filed at 05/28/2025 1827   Can you do light work around the house like dusting or washing dishes? 2.7  filed at 05/28/2025 1827   Can you do moderate work around the house like vacuuming, sweeping floors or carrying groceries? 0  filed at 05/28/2025 1827   Can you do heavy work around the house like scrubbing floors or lifting and moving heavy furniture?  0  filed at 05/28/2025 1827   Can you do yard work like raking leaves, weeding or pushing a mower? 0  filed at 05/28/2025 1827   Can you have sexual relations? 5.25  filed at  05/28/2025 1827   Can you participate in moderate recreational activities like golf, bowling, dancing, doubles tennis or throwing a baseball or football? 0  filed at 05/28/2025 1827   Can you participate in strenous sports like swimming, singles tennis, football, basketball, or skiing? 0  filed at 05/28/2025 1827   DASI SCORE 20.45  filed at 05/28/2025 1827   METS Score (Will be calculated only when all the questions are answered) 5.3  filed at 05/28/2025 1827          Caprini DVT Assessment      Flowsheet Row ED to Hosp-Admission (Discharged) from 12/9/2023 in [facility] with Inna Alfonso MD and Shayan Bellamy, DO   DVT Score (IF A SCORE IS NOT CALCULATING, MUST SELECT A BMI TO COMPLETE) 5 filed at 12/09/2023 2213   BMI (BMI MUST BE CHOSEN) 31-40 (Obesity) filed at 12/09/2023 2213   RETIRED: Age 60-75 years filed at 12/09/2023 2213          Modified Frailty Index    No data to display       [id number] Stroke Risk Points  Current as of just now        N/A 0 to 9 Points:      Last Change: N/A          The [id number] risk score (Lip GH, et al. 2009. © 2010 [employer]) quantifies the risk of stroke for a patient with atrial fibrillation. For patients without atrial fibrillation or under the age of 18 this score appears as N/A. Higher score values generally indicate higher risk of stroke.        This score is not applicable to this patient. Components are not calculated.          Revised Cardiac Risk Index    No data to display       Apfel Simplified Score    No data to display       Risk Analysis Index Results This Encounter    No data found in the last 10 encounters.       Prodigy: High Risk  Total Score: 20              Prodigy Age Score      Prodigy Previous Opioid Use Score      Prodigy SDB Score          ARISCAT Score for Postoperative Pulmonary Complications    No data to display       Garcia Perioperative Risk for Myocardial Infarction or Cardiac Arrest (SHAR)     No data to display         Nurse Plan of Action:   RN screening call complete.  Reviewed allergies, medications and pharmacy, medical, surgical and social history with patient.  Chart updated.  Instructed patient to stop vitamins/supplements 1 week prior to surgery.               [1]   Past Medical History:  Diagnosis Date    Adrenal nodule     CT abd 10.29.24 Stable 1.4 cm right adrenal nodule and nodular thickening of the left adrenal gland.    Adverse effect of anesthesia     1 episode of being combative with sedation for MRI, no issues with surgeries    Anxiety     Arthritis     Asthma     Bipolar disorder, unspecified (Multi)     Cataract     Chronic obstructive pulmonary disease, unspecified     Colon polyp     Constipation     Depression     Diverticulitis 2024    s/p lap sigmoid colectomy    GERD (gastroesophageal reflux disease)     Hiatal hernia     Hiatal hernia     History of DVT (deep vein thrombosis) 2019    left leg after COVID vaccine, on Xarelto, no recurrent clot    History of hepatitis C     treated 2018    History of MRSA infection     ~20 yrs ago, pt does not remember details    HTN (hypertension)     Hyperlipidemia     Insomnia     Lumbar disc disease     Obesity     Peripheral neuropathy     Positive TB test     PTSD (post-traumatic stress disorder)     Sleep apnea     no CPAP    Thyroid nodule     s/p bx, benign    Type 2 diabetes mellitus without complications     2.18.25 A1C 7.7% follows with endo    Vitamin D deficiency    [2]   Past Surgical History:  Procedure Laterality Date    BUNIONECTOMY Bilateral     COLECTOMY  2024    lap sigmoid colectomy    COLONOSCOPY      KNEE ARTHROSCOPY W/ DEBRIDEMENT Bilateral 2019    SHOULDER ARTHROSCOPY Left     SOFT TISSUE MASS EXCISION Left     chest    TUBAL LIGATION      UMBILICAL HERNIA REPAIR      US GUIDED THYROID BIOPSY  03/07/2022    US GUIDED THYROID BIOPSY 3/7/2022   [3]   Family History  Problem Relation Name Age of Onset    Depression  Mother Lilian     Diabetes Mother Lilian     Lymphoma Mother Lilian     Hypertension Mother Lilian     Diabetes Sister      Hypertension Sister      Diabetes Brother      Hypertension Brother      Diabetes Brother      Hypertension Brother      Diabetes Brother      COPD Other Daughter    [4]   Allergies  Allergen Reactions    Sulfa (Sulfonamide Antibiotics) Hives and Swelling    Ace Inhibitors Unknown     Pt does not remember     Albiglutide Hives    Cromolyn Hives    Hydrocodone-Homatropine Hives    Insulin Aspart Hives    Insulin Lispro Itching    Insulin Nph Human Isophane Itching    Oxybutynin Hives    Peg 3350-Sod Sulf,Chlr-Pot-Mag Hives    Penicillins Hives and Swelling    Ziprasidone Itching

## 2025-06-02 ENCOUNTER — LAB (OUTPATIENT)
Dept: LAB | Facility: HOSPITAL | Age: 70
End: 2025-06-02
Payer: COMMERCIAL

## 2025-06-02 ENCOUNTER — PRE-ADMISSION TESTING (OUTPATIENT)
Dept: PREADMISSION TESTING | Facility: HOSPITAL | Age: 70
End: 2025-06-02
Payer: COMMERCIAL

## 2025-06-02 VITALS
WEIGHT: 230.16 LBS | RESPIRATION RATE: 16 BRPM | BODY MASS INDEX: 40.78 KG/M2 | SYSTOLIC BLOOD PRESSURE: 143 MMHG | HEIGHT: 63 IN | HEART RATE: 83 BPM | OXYGEN SATURATION: 97 % | DIASTOLIC BLOOD PRESSURE: 55 MMHG | TEMPERATURE: 98 F

## 2025-06-02 DIAGNOSIS — Z01.818 PREOP TESTING: Primary | ICD-10-CM

## 2025-06-02 DIAGNOSIS — R73.9 HYPERGLYCEMIA, UNSPECIFIED: ICD-10-CM

## 2025-06-02 DIAGNOSIS — R73.9 ELEVATED BLOOD SUGAR: ICD-10-CM

## 2025-06-02 DIAGNOSIS — Z01.818 ENCOUNTER FOR OTHER PREPROCEDURAL EXAMINATION: Primary | ICD-10-CM

## 2025-06-02 DIAGNOSIS — R06.02 SOB (SHORTNESS OF BREATH): ICD-10-CM

## 2025-06-02 DIAGNOSIS — R06.02 SHORTNESS OF BREATH: ICD-10-CM

## 2025-06-02 LAB
ANION GAP SERPL CALC-SCNC: 11 MMOL/L (ref 10–20)
APTT PPP: 34 SECONDS (ref 26–36)
BASOPHILS # BLD AUTO: 0.04 X10*3/UL (ref 0–0.1)
BASOPHILS NFR BLD AUTO: 0.4 %
BUN SERPL-MCNC: 12 MG/DL (ref 6–23)
CALCIUM SERPL-MCNC: 9.6 MG/DL (ref 8.6–10.3)
CHLORIDE SERPL-SCNC: 103 MMOL/L (ref 98–107)
CO2 SERPL-SCNC: 28 MMOL/L (ref 21–32)
CREAT SERPL-MCNC: 0.63 MG/DL (ref 0.5–1.05)
EGFRCR SERPLBLD CKD-EPI 2021: >90 ML/MIN/1.73M*2
EOSINOPHIL # BLD AUTO: 0.09 X10*3/UL (ref 0–0.7)
EOSINOPHIL NFR BLD AUTO: 1 %
ERYTHROCYTE [DISTWIDTH] IN BLOOD BY AUTOMATED COUNT: 13.8 % (ref 11.5–14.5)
GLUCOSE SERPL-MCNC: 191 MG/DL (ref 74–99)
HCT VFR BLD AUTO: 40.7 % (ref 36–46)
HGB BLD-MCNC: 13.5 G/DL (ref 12–16)
IMM GRANULOCYTES # BLD AUTO: 0.06 X10*3/UL (ref 0–0.7)
IMM GRANULOCYTES NFR BLD AUTO: 0.7 % (ref 0–0.9)
INR PPP: 1.3 (ref 0.9–1.1)
LYMPHOCYTES # BLD AUTO: 1.61 X10*3/UL (ref 1.2–4.8)
LYMPHOCYTES NFR BLD AUTO: 18 %
MCH RBC QN AUTO: 30.4 PG (ref 26–34)
MCHC RBC AUTO-ENTMCNC: 33.2 G/DL (ref 32–36)
MCV RBC AUTO: 92 FL (ref 80–100)
MONOCYTES # BLD AUTO: 0.6 X10*3/UL (ref 0.1–1)
MONOCYTES NFR BLD AUTO: 6.7 %
NEUTROPHILS # BLD AUTO: 6.52 X10*3/UL (ref 1.2–7.7)
NEUTROPHILS NFR BLD AUTO: 73.2 %
NRBC BLD-RTO: 0 /100 WBCS (ref 0–0)
PLATELET # BLD AUTO: 246 X10*3/UL (ref 150–450)
POTASSIUM SERPL-SCNC: 4.3 MMOL/L (ref 3.5–5.3)
PROTHROMBIN TIME: 14 SECONDS (ref 9.8–12.4)
RBC # BLD AUTO: 4.44 X10*6/UL (ref 4–5.2)
SODIUM SERPL-SCNC: 138 MMOL/L (ref 136–145)
WBC # BLD AUTO: 8.9 X10*3/UL (ref 4.4–11.3)

## 2025-06-02 PROCEDURE — 93005 ELECTROCARDIOGRAM TRACING: CPT | Performed by: PHYSICIAN ASSISTANT

## 2025-06-02 PROCEDURE — 93010 ELECTROCARDIOGRAM REPORT: CPT | Performed by: INTERNAL MEDICINE

## 2025-06-02 PROCEDURE — 87081 CULTURE SCREEN ONLY: CPT | Mod: AHULAB | Performed by: PHYSICIAN ASSISTANT

## 2025-06-02 PROCEDURE — 36415 COLL VENOUS BLD VENIPUNCTURE: CPT

## 2025-06-02 PROCEDURE — 99204 OFFICE O/P NEW MOD 45 MIN: CPT | Performed by: PHYSICIAN ASSISTANT

## 2025-06-02 PROCEDURE — 85025 COMPLETE CBC W/AUTO DIFF WBC: CPT

## 2025-06-02 PROCEDURE — 85730 THROMBOPLASTIN TIME PARTIAL: CPT

## 2025-06-02 PROCEDURE — 80048 BASIC METABOLIC PNL TOTAL CA: CPT

## 2025-06-02 PROCEDURE — 83036 HEMOGLOBIN GLYCOSYLATED A1C: CPT

## 2025-06-02 PROCEDURE — 85610 PROTHROMBIN TIME: CPT

## 2025-06-02 RX ORDER — CHLORHEXIDINE GLUCONATE ORAL RINSE 1.2 MG/ML
SOLUTION DENTAL
Qty: 473 ML | Refills: 0 | Status: SHIPPED | OUTPATIENT
Start: 2025-06-02

## 2025-06-02 ASSESSMENT — ENCOUNTER SYMPTOMS
NEUROLOGICAL NEGATIVE: 1
CARDIOVASCULAR NEGATIVE: 1
EYES NEGATIVE: 1
ENDOCRINE NEGATIVE: 1
GASTROINTESTINAL NEGATIVE: 1
CONSTITUTIONAL NEGATIVE: 1
PSYCHIATRIC NEGATIVE: 1
HEMATOLOGIC/LYMPHATIC NEGATIVE: 1
ALLERGIC/IMMUNOLOGIC NEGATIVE: 1
MUSCULOSKELETAL NEGATIVE: 1
RESPIRATORY NEGATIVE: 1

## 2025-06-02 NOTE — CPM/PAT NURSE NOTE
CPM/PAT Nurse Note      Name: Nahomi Saavedra (Nahomi Saavedra)  /Age: 1955/70 y.o.       Medical History[1]    Surgical History[2]    Patient  has no history on file for sexual activity.    Family History[3]    Allergies[4]    Prior to Admission medications    Medication Sig Start Date End Date Taking? Authorizing Provider   albuterol (Ventolin HFA) 90 mcg/actuation inhaler Inhale if needed for wheezing or shortness of breath. 1-2puffs q 4-6hrs   Yes Historical Provider, MD   albuterol 2.5 mg /3 mL (0.083 %) nebulizer solution Take 1 ampule by nebulization every 6 hours if needed for wheezing or shortness of breath.   Yes Historical Provider, MD   Allergy, diphenhydrAMINE, 25 mg capsule Take by mouth. Allergy, diphenhydrAMINE, 25 mg capsule 23  Yes Historical Provider, MD   amLODIPine (Norvasc) 10 mg tablet Take 1 tablet (10 mg) by mouth once daily.   Yes Historical Provider, MD   B.animalis,bifid,infantis,long (PROBIOTIC 4X ORAL) Take by mouth.   Yes Historical Provider, MD   cetirizine (ZyrTEC) 10 mg tablet Take 1 tablet (10 mg) by mouth once daily.   Yes Historical Provider, MD   cholecalciferol (Vitamin D3) 50 mcg (2,000 units) tablet Take 1 tablet (50 mcg) by mouth once daily.   Yes Historical Provider, MD   diazePAM (Valium) 5 mg tablet Take 1 tablet (5 mg) by mouth every 8 hours if needed for anxiety. Max daily dose of 3 22  Yes Historical Provider, MD   HumaLOG Mix 75-25 KwikPen 100 unit/mL (75-25) injection Use up to 26 units twice daily with meals 3/4/24  Yes DESTINY Mohamud-NAHOMI   hydroCHLOROthiazide (HYDRODiuril) 25 mg tablet Take 1 tablet (25 mg) by mouth once daily. 23  Yes Historical Provider, MD   ibuprofen (IBU) 600 mg tablet Take 1 tablet (600 mg) by mouth once daily as needed (pain). 3/2/20  Yes Historical Provider, MD   lidocaine (Lidoderm) 5 % patch Place on the skin if needed. lidocaine (Lidoderm) 5 % patch   Yes Historical Provider, MD   oxyCODONE-acetaminophen  "(Percocet)  mg tablet Take by mouth if needed.   Yes Historical Provider, MD   sertraline (Zoloft) 50 mg tablet Take by mouth. sertraline (Zoloft) 50 mg tablet 2/6/17  Yes Historical Provider, MD   tiZANidine (Zanaflex) 4 mg tablet Take 1 tablet (4 mg) by mouth every 6 hours if needed for muscle spasms for up to 10 days. 4/30/25 6/2/25 Yes GARRETT Blanc   triamcinolone (Kenalog) 0.5 % cream Apply topically if needed. triamcinolone (Kenalog) 0.5 % cream   Yes Historical Provider, MD   Xarelto 2.5 mg tablet Take 1 tablet (2.5 mg) by mouth once daily. 12/26/22  Yes Historical Provider, MD   BD Ultra-Fine Micro Pen Needle 32 gauge x 1/4\" needle once daily. BD Ultra-Fine Micro Pen Needle 32 gauge x 1/4\" needle 12/14/22   Historical Provider, MD   chlorhexidine (Peridex) 0.12 % solution 15 ml swish and spit for 30 seconds night prior to surgery and morning of surgery 6/2/25   Chikis Swenson PA-C   lancets (OneTouch Delica Plus Lancet) 30 gauge [facility]     Historical Provider, MD   OneTouch Verio test strips strip 4 times a day. 12/2/22   Historical Provider, MD LÓPEZ MORAES     DASI Risk Score      Flowsheet Row Questionnaire Series Submission from 5/28/2025 in [facility] OR with Generic Provider Chrystal   Can you take care of yourself (eat, dress, bathe, or use toilet)?  2.75  filed at 05/28/2025 1827   Can you walk indoors, such as around your house? 1.75  filed at 05/28/2025 1827   Can you walk a block or two on level ground?  0  filed at 05/28/2025 1827   Can you climb a flight of stairs or walk up a hill? 0  filed at 05/28/2025 1827   Can you run a short distance? 8  filed at 05/28/2025 1827   Can you do light work around the house like dusting or washing dishes? 2.7  filed at 05/28/2025 1827   Can you do moderate work around the house like vacuuming, sweeping floors or carrying groceries? 0  filed at 05/28/2025 1827   Can you do heavy work around the house like scrubbing floors or " lifting and moving heavy furniture?  0  filed at 05/28/2025 1827   Can you do yard work like raking leaves, weeding or pushing a mower? 0  filed at 05/28/2025 1827   Can you have sexual relations? 5.25  filed at 05/28/2025 1827   Can you participate in moderate recreational activities like golf, bowling, dancing, doubles tennis or throwing a baseball or football? 0  filed at 05/28/2025 1827   Can you participate in strenous sports like swimming, singles tennis, football, basketball, or skiing? 0  filed at 05/28/2025 1827   DASI SCORE 20.45  filed at 05/28/2025 1827   METS Score (Will be calculated only when all the questions are answered) 5.3  filed at 05/28/2025 1827          Caprini DVT Assessment      Flowsheet Row ED to Hosp-Admission (Discharged) from 12/9/2023 in [facility] with Inna Alfonso MD and Shayan Bellamy, DO   DVT Score (IF A SCORE IS NOT CALCULATING, MUST SELECT A BMI TO COMPLETE) 5 filed at 12/09/2023 2213   BMI (BMI MUST BE CHOSEN) 31-40 (Obesity) filed at 12/09/2023 2213   RETIRED: Age 60-75 years filed at 12/09/2023 2213          Modified Frailty Index    No data to display       [id number] Stroke Risk Points  Current as of just now        N/A 0 to 9 Points:      Last Change: N/A          The [id number] risk score (Lip GH, et al. 2009. © 2010 [employer]) quantifies the risk of stroke for a patient with atrial fibrillation. For patients without atrial fibrillation or under the age of 18 this score appears as N/A. Higher score values generally indicate higher risk of stroke.        This score is not applicable to this patient. Components are not calculated.          Revised Cardiac Risk Index    No data to display       Apfel Simplified Score    No data to display       Risk Analysis Index Results This Encounter    No data found in the last 10 encounters.       Prodigy: High Risk  Total Score: 20              Prodigy Age Score      Prodigy  Previous Opioid Use Score      Prodigy SDB Score          ARISCAT Score for Postoperative Pulmonary Complications      Flowsheet Row Pre-Admission Testing from 6/2/2025 in [facility] with Chikis Swenson PA-C   Age Calculated Score 3 filed at 06/02/2025 1301   Preoperative SpO2 0 filed at 06/02/2025 1301   Respiratory infection in the last month Either upper or lower (i.e., URI, bronchitis, pneumonia), with fever and antibiotic treatment 0 filed at 06/02/2025 1301   Preoperative anemia (Hgb less than 10 g/dl) 0 filed at 06/02/2025 1301   Surgical incision  0 filed at 06/02/2025 1301   Duration of surgery  16 filed at 06/02/2025 1301   Emergency Procedure  0 filed at 06/02/2025 1301   ARISCAT Total Score  19 filed at 06/02/2025 1301          Radha Perioperative Risk for Myocardial Infarction or Cardiac Arrest (SHAR)    No data to display         Nurse Plan of Action:     After Visit Summary (AVS) reviewed and patient verbalized good understanding of medications and NPO instructions.  Pre-op infection prevention measures:  CHG showers and mouthwash reviewed, understanding voiced.  CHG soap given and patient verbalized need to pick CHG mouthwash at their preferred local pharmacy.              [1]   Past Medical History:  Diagnosis Date    Adrenal nodule     CT abd 10.29.24 Stable 1.4 cm right adrenal nodule and nodular thickening of the left adrenal gland.    Adverse effect of anesthesia     1 episode of being combative with sedation for MRI, no issues with surgeries    Anxiety     Arthritis     Asthma     Bipolar disorder, unspecified (Multi)     Chronic obstructive pulmonary disease, unspecified     Constipation     Depression     GERD (gastroesophageal reflux disease)     Hiatal hernia     History of DVT (deep vein thrombosis) 2019    left leg after COVID vaccine, on Xarelto, no recurrent clot    History of hepatitis C     treated 2018    History of MRSA infection     ~20 yrs ago, pt does not  remember details    HTN (hypertension)     Hyperlipidemia     Insomnia     Lumbar disc disease     Obesity     Peripheral neuropathy     Positive TB test     PTSD (post-traumatic stress disorder)     Shoulder pain, bilateral     PLAN: right Shoulder Total Reverse Arthroplasty 6/9/25    Sleep apnea     no CPAP    Thyroid nodule     s/p bx, benign    Type 2 diabetes mellitus without complications     follows with endo    Vitamin D deficiency    [2]   Past Surgical History:  Procedure Laterality Date    BUNIONECTOMY Bilateral     COLECTOMY  2024    lap sigmoid colectomy    COLONOSCOPY      KNEE ARTHROSCOPY W/ DEBRIDEMENT Bilateral 2019    SHOULDER ARTHROSCOPY Left     SOFT TISSUE MASS EXCISION Left     chest    TUBAL LIGATION      UMBILICAL HERNIA REPAIR      US GUIDED THYROID BIOPSY  03/07/2022    US GUIDED THYROID BIOPSY 3/7/2022   [3]   Family History  Problem Relation Name Age of Onset    Depression Mother Lilian     Diabetes Mother Lilian     Lymphoma Mother Lilian     Hypertension Mother Lilian     Diabetes Sister      Hypertension Sister      Diabetes Brother      Hypertension Brother      Diabetes Brother      Hypertension Brother      Diabetes Brother      COPD Other Daughter    [4]   Allergies  Allergen Reactions    Sulfa (Sulfonamide Antibiotics) Hives and Swelling    Ace Inhibitors Unknown     Pt does not remember     Albiglutide Hives    Cromolyn Hives    Hydrocodone-Homatropine Hives    Insulin Aspart Hives    Insulin Lispro Itching    Insulin Nph Human Isophane Itching    Oxybutynin Hives    Peg 3350-Sod Sulf,Chlr-Pot-Mag Hives    Penicillins Hives and Swelling    Ziprasidone Itching

## 2025-06-02 NOTE — PREPROCEDURE INSTRUCTIONS
"   Medication List            Accurate as of June 2, 2025 12:57 PM. Always use your most recent med list.                * albuterol 2.5 mg /3 mL (0.083 %) nebulizer solution  Medication Adjustments for Surgery: Take/Use as prescribed     * Ventolin HFA 90 mcg/actuation inhaler  Generic drug: albuterol  Medication Adjustments for Surgery: Take/Use as prescribed     Allergy (diphenhydrAMINE) 25 mg capsule  Generic drug: diphenhydrAMINE  Medication Adjustments for Surgery: Take/Use as prescribed     amLODIPine 10 mg tablet  Commonly known as: Norvasc  Medication Adjustments for Surgery: Take on the morning of surgery     BD Ultra-Fine Micro Pen Needle 32 gauge x 1/4\" needle  Generic drug: pen needle, diabetic     diazePAM 5 mg tablet  Commonly known as: Valium  Medication Adjustments for Surgery: Take/Use as prescribed     HumaLOG Mix 75-25 KwikPen 100 unit/mL (75-25) pen  Generic drug: insulin lispro protamin-lispro  Use up to 26 units twice daily with meals  Medication Adjustments for Surgery: Do Not take on the morning of surgery     hydroCHLOROthiazide 25 mg tablet  Commonly known as: HYDRODiuril  Medication Adjustments for Surgery: Take on the morning of surgery      mg tablet  Generic drug: ibuprofen  Notes to patient: HOLD 7 Days prior to surgery - Last dose 6/1 Stop NOW if not already stopped      Lidoderm 5 % patch  Generic drug: lidocaine  Medication Adjustments for Surgery: Do Not take on the morning of surgery     OneTouch Delica Plus Lancet 30 gauge misc  Generic drug: lancets     OneTouch Verio test strips  Generic drug: blood sugar diagnostic     oxyCODONE-acetaminophen  mg tablet  Commonly known as: Percocet  Medication Adjustments for Surgery: Take/Use as prescribed     PROBIOTIC 4X ORAL  Medication Adjustments for Surgery: Do Not take on the morning of surgery     sertraline 50 mg tablet  Commonly known as: Zoloft  Medication Adjustments for Surgery: Take/Use as prescribed     tiZANidine " 4 mg tablet  Commonly known as: Zanaflex  Take 1 tablet (4 mg) by mouth every 6 hours if needed for muscle spasms for up to 10 days.  Medication Adjustments for Surgery: Take/Use as prescribed     triamcinolone 0.5 % cream  Commonly known as: Kenalog  Medication Adjustments for Surgery: Do Not take on the morning of surgery     Vitamin D3 50 mcg (2,000 units) tablet  Generic drug: cholecalciferol  Medication Adjustments for Surgery: Do Not take on the morning of surgery     Xarelto 2.5 mg tablet  Generic drug: rivaroxaban  Notes to patient: HOLD 3 Days prior to surgery - Last dose 6/5     ZyrTEC 10 mg tablet  Generic drug: cetirizine  Medication Adjustments for Surgery: Take last dose 1 day (24 hours) before surgery           * This list has 2 medication(s) that are the same as other medications prescribed for you. Read the directions carefully, and ask your doctor or other care provider to review them with you.                         Preoperative Brain Exercises    What are brain exercises?  A brain exercise is any activity that engages your thinking (cognitive) skills.    What types of activities are considered brain exercises?  Jigsaw puzzles, crossword puzzles, word jumble, memory games, word search, and many more.  Many can be found free online or on your phone via a mobile jordin.    Why should I do brain exercises before my surgery?  More recent research has shown brain exercise before surgery can lower the risk of postoperative delirium (confusion) which can be especially important for older adults.  Patients who did brain exercises for 5 to 10 hours (total) for the 7-10 days before surgery, cut their risk of postoperative delirium in half up to 1 week after surgery.  Concerning above medication instructions - If medication is normally taken at night continue normal schedule - do not take night prior and morning of surgery.       Preoperative Deep Breathing Exercises  Why it is important to do deep breathing  exercises before my surgery?  Deep breathing exercises strengthen your breathing muscles.  This helps you to recover after your surgery and decreases the chance of breathing complications.  How are the deep breathing exercises done?  Sit straight with your back supported.  Breathe in deeply and slowly through your nose. Your lower rib cage should expand and your abdomen may move forward.  Hold that breath for 3 to 5 seconds.  Breathe out through pursed lips, slowly and completely.  Rest and repeat 10 times every hour while awake.  Rest longer if you become dizzy or lightheaded.      CONTACT SURGEON'S OFFICE IF YOU DEVELOP:  * Fever = 100.4 F   * New respiratory symptoms (e.g. cough, shortness of breath, respiratory distress, sore throat)  * Recent loss of taste or smell  *Flu like symptoms such as headache, fatigue or gastrointestinal symptoms  * You develop any open sores, shingles, burning or painful urination   AND/OR:  * You no longer wish to have the surgery.  * Any other personal circumstances change that may lead to the need to cancel or defer this surgery.  *You were admitted to any hospital within one week of your planned procedure.    SMOKING:  *Quitting smoking can make a huge difference to your health and recovery from surgery.    *If you need help with quitting, call 1-860-QUIT-NOW.    THE DAY OF SURGERY:  *Do not eat any food after midnight the night before surgery/procedure.   *YOU MUST DRINK 14 oz drink clear liquids (i.e. water, black coffee/tea, (no milk or cream) apple juice, and electrolyte drinks (Gatorade)  2 hours before your instructed arrival time to the hospital.  *You may chew gum until  2 hours before your surgery/procedure.    SURGICAL TIME  *You will be contacted between 2 p.m. and 6 p.m. the business day before your surgery with your arrival time.  *If you haven't received a call by 6pm, call [phone].  *Scheduled surgery times may change and you will be notified if this  occurs-check your personal voicemail for any updates.    ON THE MORNING OF SURGERY:  *Wear comfortable, loose fitting clothing.   *Do not use moisturizers, creams, lotions or perfume.  *All jewelry and valuables should be left at home.  *Prosthetic devices such as contact lenses, hearing aids, dentures, eyelash extensions, hairpins and body piercing must be removed before surgery.    BRING WITH YOU:  *Photo ID and insurance card  *Current list of medicines and allergies  *Pacemaker/Defibrillator/Heart stent cards  *CPAP machine and mask  *Slings/splints/crutches  *Copy of your complete Advanced Directive/DHPOA-if applicable  *Neurostimulator implant remote    PARKING AND ARRIVAL:  *Check in at the Main Entrance desk and let them know you are here for surgery.  *You will be directed to the 2nd floor surgical waiting area.    AFTER OUTPATIENT SURGERY:  *A responsible adult MUST accompany you at the time of discharge and stay with you for 24 hours after your surgery.  *You may NOT drive yourself home after surgery.  *You may use a taxi or ride sharing service ([employer], [employer]) to return home ONLY if you are accompanied by a friend or family member.  *Instructions for resuming your medications will be provided by your surgeon.    Home Preoperative Antibacterial Shower     What is a home preoperative antibacterial shower?  This shower is a way of cleaning the skin with a germ killing soap before surgery.  The soap contains chlorhexidine, commonly known as CHG.  CHG is a soap for your skin with germ killing ability.  Let your doctor know if you are allergic to chlorhexidine.    Why do I need to take a preoperative antibacterial shower?  Skin is not sterile.  It is best to try to make your skin as free of germs as possible before surgery.  Proper cleansing with a germ killing soap before surgery can lower the number of germs on your skin.  This helps to reduce the risk of infection at the surgical site.  Following the  instructions listed below will help you prepare your skin for surgery.      How do I use the CHG skin cleanser?  Steps:  Begin using your CHG soap five days before your scheduled surgery on ________________________.    Days 1-4 Shower before bed:  Wash your face and genitals with your normal soap and rinse.           2.    Apply the CHG soap to a clean wet washcloth.  Turn the water off or move away                From the water spray to avoid premature rinsing of the CHG soap as you are applying.     3.   Lather your entire body from the neck down.  Do not use on your face or genitals.  4. Pay special attention to the area(s) where your incision(s) will be located unless they are on your face.  Avoid scrubbing your skin too hard.  The important point is to have the CHG soap sit on your skin for 3 minutes.    When the 3 minutes are up, turn on the water and rinse the CHG soap off your body completely.   Pat yourself dry with a clean, freshly-laundered towel.  Dress in clean, freshly laundered night clothes.    Be sure to change bed sheets and blankets at least on the first night of CHG body wash use. May change linens every night of the above protocol for maximum benefit.   Day 5:  Last shower is the morning of surgery: Follow above Instructions.    NOTE:        *Keep CHG soap out of eyes and ear canals   *DO NOT wash with regular soap on your body after you have used the CHG soap solution  *DO NOT apply powders, lotions, or perfume.  *Deodorant may be used days 1-4, BUT NOT the day of surgery    Who should I contact if I have any questions regarding the use of CHG soap?  Call the [facility], Preadmission Testing at [phone] if you have any questions.              Patient Information: Pre-Operative Infection Prevention Measures     Why did I have my nose, under my arms and groin swabbed?  The purpose of the swab is to identify Staphylococcus aureus inside your nose or on your skin.   The swab was sent to the laboratory for culture.  A positive swab/culture for Staphylococcus aureus is called colonization or carriage.      What is Staphylococcus aureus?  Staphylococcus aureus, also known as “staph”, is a germ found on the skin or in the nose of healthy people.  Sometimes Staphylococcus aureus can get into the body and cause an infection.  This can be minor (such as pimples, boils or other skin problems).  It might also be serious (such as blood infection, pneumonia or a surgical site infection).    What is Staphylococcus aureus colonization or carriage?  Colonization or carriage means that a person has the germ but is not sick from it.  These bacteria can be spread on the hands or when breathing or sneezing.    How is Staphylococcus aureus spread?  It is most often spread by close contact with a person or item that carries it.    What happens if my culture is positive for Staphylococcus aureus?  Your doctor/medical team will use this information to guide any antibiotic treatment which may be necessary.  Regardless of the culture results, we will clean the inside of your nose with a betadine swab just before you have your surgery.      Will I get an infection if I have Staphylococcus aureus in my nose or on my skin?  Anyone can get an infection with Staphylococcus aureus.  However, the best way to reduce your risk of infection is to follow the instructions provided to you for the use of your CHG soap and dental rinse.        Who should I contact if I have any questions?  Call the [facility], Preadmission Testing at [phone] if you have any questions.          Patient Information: Oral/Dental Rinse  **This is a prescription; pick it up at your preferred local pharmacy **  What is oral/dental rinse?   It is a mouthwash. It is a way of cleaning the mouth with a germ killing solution before your surgery.  The solution contains chlorhexidine, commonly known as CHG.    It is used inside the mouth to kill a bacteria known as Staphylococcus aureus.  Let your doctor know if you are allergic to Chlorhexidine.    Why do I need to use CHG oral/dental rinse?  The CHG oral/dental rinse helps to kill a bacteria in your mouth known a Staphylococcus aureus.     This reduces the risk of infection at the surgical site.      Using your CHG oral/dental rinse  STEPS:  Use your CHG oral/dental rinse after you brush your teeth the night before (at bedtime) and the morning of your surgery.  Follow all directions on your prescription label.    Use the cap on the container to measure 15ml (fill cap to fill line)  Swish (gargle if you can) the mouthwash in your mouth for at least 30 seconds, (do not to swallow) spit out  After you use your CHG rinse, do not rinse your mouth with water, drink or eat.  Please refer to prescription label for the appropriate time to resume oral intake  Dental rinse comes in one size bottle: 473ml ~16oz.  You will have leftover    rinse, discard after this use.    What side effects might I have using the CHG oral/dental rinse?  CHG rinse will stick to plaque on the teeth.  Brush and floss just before use.  Teeth brushing will help avoid staining of plaque during use.    Who should I contact if I have questions about the CHG oral/dental rinse?  Please call [facility], Preadmission Testing at [phone] if you have any questions           Patient Information: Oral/Dental Rinse  **This is a prescription; pick it up at your preferred local pharmacy **  What is oral/dental rinse?   It is a mouthwash. It is a way of cleaning the mouth with a germ killing solution before your surgery.  The solution contains chlorhexidine, commonly known as CHG.   It is used inside the mouth to kill a bacteria known as Staphylococcus aureus.  Let your doctor know if you are allergic to Chlorhexidine.    Why do I need to use CHG oral/dental rinse?  The CHG  oral/dental rinse helps to kill a bacteria in your mouth known a Staphylococcus aureus.     This reduces the risk of infection at the surgical site.      Using your CHG oral/dental rinse  STEPS:  Use your CHG oral/dental rinse after you brush your teeth the night before (at bedtime) and the morning of your surgery.  Follow all directions on your prescription label.    Use the cap on the container to measure 15ml (fill cap to fill line)  Swish (gargle if you can) the mouthwash in your mouth for at least 30 seconds, (do not to swallow) spit out  After you use your CHG rinse, do not rinse your mouth with water, drink or eat.  Please refer to prescription label for the appropriate time to resume oral intake  Dental rinse comes in one size bottle: 473ml ~16oz.  You will have leftover    rinse, discard after this use.    What side effects might I have using the CHG oral/dental rinse?  CHG rinse will stick to plaque on the teeth.  Brush and floss just before use.  Teeth brushing will help avoid staining of plaque during use.    Who should I contact if I have questions about the CHG oral/dental rinse?  Please call [facility], Preadmission Testing at [phone] if you have any questions

## 2025-06-02 NOTE — CPM/PAT H&P
[facility]/PAT Evaluation       Name: Nahomi Saavedra (Nahomi Saavedra)  /Age: 1955/70 y.o.     In-Person       Chief Complaint: Right shoulder pain, unspecified chronicity [M25.511]  Complete tear of right rotator cuff, unspecified whether traumatic [M75.121]     HPI      Date of Consult: 25    Referring Provider: Dr. Negrete     Surgery, Date, and Length: Right Shoulder Total Reverse Arthroplasty - Right ; 25; 125 minutes     Nahomi Saavedra is a 70 year-old female who presents to the [facility] for perioperative risk assessment prior to surgery. The patient has been having ongoing bilateral shoulder pain for many years now. She has had extensive conservative management including acupuncture, multiple cortisone shots, most recent of which was last year, as well as more than 6 weeks of physical therapy on both shoulders. Unfortunately, she continues have significant pain and weakness with both shoulders. Left side is more painful than the right and she is left-hand dominant.       This note was created in part upon personal review of patient's medical records.      Patient is scheduled to have Right Shoulder Total Reverse Arthroplasty - Right     Medical History  Past Medical History:   Diagnosis Date    Adrenal nodule     CT abd 10.29.24 Stable 1.4 cm right adrenal nodule and nodular thickening of the left adrenal gland.    Adverse effect of anesthesia     1 episode of being combative with sedation for MRI, no issues with surgeries    Anxiety     Arthritis     Asthma     Bipolar disorder, unspecified (Multi)     Chronic obstructive pulmonary disease, unspecified     Constipation     Depression     GERD (gastroesophageal reflux disease)     diet controlled    Hiatal hernia     History of DVT (deep vein thrombosis) 2019    left leg after COVID vaccine, on Xarelto, no recurrent clot    History of hepatitis C     treated 2018 and resolved    History of MRSA infection     ~20 yrs ago, pt does not remember details     HTN (hypertension)     Hyperlipidemia     Insomnia     Lumbar disc disease     Obesity     Peripheral neuropathy     Positive TB test     PTSD (post-traumatic stress disorder)     Shoulder pain, bilateral     PLAN: right Shoulder Total Reverse Arthroplasty 6/9/25    Sleep apnea     no CPAP    Thyroid nodule     s/p bx, benign    Type 2 diabetes mellitus without complications     follows with endo    Vitamin D deficiency             Surgical History  Past Surgical History:   Procedure Laterality Date    BUNIONECTOMY Bilateral     COLECTOMY  2024    lap sigmoid colectomy    COLONOSCOPY      KNEE ARTHROSCOPY W/ DEBRIDEMENT Bilateral 2019    SHOULDER ARTHROSCOPY Left     SOFT TISSUE MASS EXCISION Left     chest lipoma    TUBAL LIGATION      UMBILICAL HERNIA REPAIR      US GUIDED THYROID BIOPSY  03/07/2022    US GUIDED THYROID BIOPSY 3/7/2022             Family history:  Family History   Problem Relation Name Age of Onset    Depression Mother Lilian     Diabetes Mother Lilian     Lymphoma Mother Lilian     Hypertension Mother Lilian     Diabetes Sister      Hypertension Sister      Diabetes Brother      Hypertension Brother      Diabetes Brother      Hypertension Brother      Diabetes Brother      COPD Other Daughter         Social history:  Social History     Socioeconomic History    Marital status:      Spouse name: Not on file    Number of children: Not on file    Years of education: Not on file    Highest education level: Not on file   Occupational History    Not on file   Tobacco Use    Smoking status: Every Day     Current packs/day: 0.25     Average packs/day: 0.3 packs/day for 45.4 years (11.4 ttl pk-yrs)     Types: Cigarettes     Start date: 1980    Smokeless tobacco: Never   Vaping Use    Vaping status: Never Used   Substance and Sexual Activity    Alcohol use: Yes     Comment: few times/year    Drug use: Not Currently     Comment: remote mariuuana    Sexual activity: Not on file   Other  "Topics Concern    Not on file   Social History Narrative    Not on file     Social Drivers of Health     Financial Resource Strain: At Risk (2025)    Received from [employer]    Financial Resource Strain     Financial Resource Strain: 2   Food Insecurity: Food Insecurity Present (2025)    Received from [facility]    Hunger Vital Sign     Worried About Running Out of Food in the Last Year: Often true     Ran Out of Food in the Last Year: Often true   Transportation Needs: Not at Risk (2025)    Received from [facility]    Transportation Needs     Transportation: 1   Physical Activity: Insufficiently Active (2024)    Received from [facility]    Exercise Vital Sign     Days of Exercise per Week: 3 days     Minutes of Exercise per Session: 30 min   Stress: Stress Concern Present (2024)    Received from [facility]    [employer] - Occupational Stress Questionnaire     Feeling of Stress : Very much   Social Connections: Not at Risk (2025)    Received from [facility]    Social Connections     Connectedness: 1   Intimate Partner Violence: Not on file   Housing Stability: Not at Risk (2025)    Received from [facility]    Housing Stability     Housin        Current Outpatient Medications   Medication Instructions    albuterol (Ventolin HFA) 90 mcg/actuation inhaler As needed    albuterol 2.5 mg /3 mL (0.083 %) nebulizer solution 1 ampule, Every 6 hours PRN    Allergy, diphenhydrAMINE, 25 mg capsule Take by mouth. Allergy, diphenhydrAMINE, 25 mg capsule    amLODIPine (NORVASC) 10 mg, Daily    B.animalis,bifid,infantis,long (PROBIOTIC 4X ORAL) Take by mouth.    BD Ultra-Fine Micro Pen Needle 32 gauge x /\" needle Daily    cetirizine (ZYRTEC) 10 mg, Daily    chlorhexidine (Peridex) 0.12 % solution 15 ml swish and spit for 30 seconds night prior to surgery and morning of surgery    cholecalciferol (VITAMIN D3) 50 mcg, Daily    diazePAM (VALIUM) 5 mg, Every 8 hours PRN    " "HumaLOG Mix 75-25 KwikPen 100 unit/mL (75-25) injection Use up to 26 units twice daily with meals    hydroCHLOROthiazide (HYDRODiuril) 25 mg tablet 1 tablet, Daily    ibuprofen (IBU) 600 mg, Daily PRN    lancets (OneTouch Delica Plus Lancet) 30 gauge misc No dose, route, or frequency recorded.    lidocaine (Lidoderm) 5 % patch As needed    OneTouch Verio test strips strip 4 times daily    oxyCODONE-acetaminophen (Percocet)  mg tablet As needed    sertraline (Zoloft) 50 mg tablet Take by mouth. sertraline (Zoloft) 50 mg tablet    tiZANidine (ZANAFLEX) 4 mg, oral, Every 6 hours PRN    triamcinolone (Kenalog) 0.5 % cream As needed    Xarelto 2.5 mg tablet 1 tablet, Daily            Visit Vitals  /55   Pulse 83   Temp 36.7 °C (98 °F)   Resp 16   Ht 1.6 m (5' 3\")   Wt 104 kg (230 lb 2.6 oz)   LMP  (LMP Unknown)   SpO2 97%   BMI 40.77 kg/m²   OB Status Postmenopausal   Smoking Status Every Day   BSA 2.15 m²         Review of Systems   Constitutional: Negative.    HENT: Negative.     Eyes: Negative.    Respiratory: Negative.     Cardiovascular: Negative.         METS 4  +stairs / walk in from lot / shop / house work Without chest pain    Gastrointestinal: Negative.    Endocrine: Negative.    Genitourinary: Negative.    Musculoskeletal: Negative.         Bilateral shoulder pain    Skin: Negative.    Allergic/Immunologic: Negative.    Neurological: Negative.    Hematological: Negative.    Psychiatric/Behavioral: Negative.          Physical Exam  Vitals reviewed.   Constitutional:       Appearance: Normal appearance. She is obese.   HENT:      Head: Normocephalic and atraumatic.      Right Ear: External ear normal.      Left Ear: External ear normal.      Nose: Nose normal.      Mouth/Throat:      Pharynx: Oropharynx is clear.   Eyes:      Extraocular Movements: Extraocular movements intact.      Conjunctiva/sclera: Conjunctivae normal.      Pupils: Pupils are equal, round, and reactive to light.   Cardiovascular: "      Rate and Rhythm: Normal rate and regular rhythm.      Heart sounds: Normal heart sounds.   Pulmonary:      Effort: Pulmonary effort is normal.      Breath sounds: Normal breath sounds.   Abdominal:      Palpations: Abdomen is soft.   Musculoskeletal:      Cervical back: Normal range of motion and neck supple.      Comments: Decreased ROM bilateral shoulders    Skin:     General: Skin is warm and dry.   Neurological:      General: No focal deficit present.      Mental Status: She is alert and oriented to person, place, and time.   Psychiatric:         Mood and Affect: Mood normal.         Behavior: Behavior normal.          PAT AIRWAY:   Airway:     Mallampati::  II    Neck ROM::  Full      EKG 6/2/25  NSR  Normal  77 BPM     Patient is scheduled to have Right Shoulder Total Reverse Arthroplasty - Right     Cardiovascular  METS: 4   RCRI: 0  , 3.9 % Risk of MACE  Caprini: 11      HTN- amlodipine and hydrochlorothiazide Continue DOS   Hx DVT - xarelto HOLD 3 Days prior to surgery     Pulmonary:  Stop Bang +GUNNER no CPAP  ARISCAT: <26 points, 1.6% risk of in-hospital postoperative pulmonary complication  PRODIGY: High risk for opioid induced respiratory depression    COPD/asthma  - inhalers Continue DOS     Neurology     No neurologic diagnosis, however, the patient is at increased risk for perioperative delirium secondary to  age, polypharmacy, Patient instructed on and provided cognitive exercises  Patient is at increased risk for perioperative CVA secondary to  HTN, DM, increased age     Renal  Recommendations to avoid nephrotoxic drugs and carefully monitor fluid status to maintain euvolemia. Use dose adjusted medications as needed for the underlying level of renal function.    Endocrinology  DM- Humalog HOLD DOS     Hematology       Patient instructed to ambulate as soon as possible postoperatively to decrease thromboembolic risk.      Initiate mechanical DVT prophylaxis as soon as possible and initiate  chemical prophylaxis when deemed safe from a bleeding standpoint post surgery.       VTE prophylaxis per surgical team         Tests ordered in PAT: cbc, bmp, coag, A1c, mrsa, EKG       Risk assessment complete.  Patient is scheduled for a intermediate surgical risk procedure.        Preoperative medication instructions were provided and reviewed with the patient.  Any additional testing or evaluation was explained to the patient.  Nothing by mouth instructions were discussed and patient's questions were answered prior to conclusion to this encounter.  Patient verbalized understanding of preoperative instructions given in preadmission testing; discharge instructions available in EMR.

## 2025-06-03 ENCOUNTER — TELEPHONE (OUTPATIENT)
Dept: PAIN MEDICINE | Facility: CLINIC | Age: 70
End: 2025-06-03
Payer: COMMERCIAL

## 2025-06-03 LAB
EST. AVERAGE GLUCOSE BLD GHB EST-MCNC: 174 MG/DL
HBA1C MFR BLD: 7.7 % (ref ?–5.7)

## 2025-06-03 NOTE — TELEPHONE ENCOUNTER
Spoke with patient--she will call us at a later time to schedule injection, she is having shoulder surgery at this time

## 2025-06-04 LAB
ATRIAL RATE: 77 BPM
P AXIS: 62 DEGREES
P OFFSET: 200 MS
P ONSET: 146 MS
PR INTERVAL: 150 MS
Q ONSET: 221 MS
QRS COUNT: 13 BEATS
QRS DURATION: 86 MS
QT INTERVAL: 366 MS
QTC CALCULATION(BAZETT): 414 MS
QTC FREDERICIA: 397 MS
R AXIS: 26 DEGREES
STAPHYLOCOCCUS SPEC CULT: NORMAL
T AXIS: 78 DEGREES
T OFFSET: 404 MS
VENTRICULAR RATE: 77 BPM

## 2025-06-08 ENCOUNTER — ANESTHESIA EVENT (OUTPATIENT)
Dept: OPERATING ROOM | Facility: HOSPITAL | Age: 70
End: 2025-06-08

## 2025-06-09 ENCOUNTER — TELEPHONE (OUTPATIENT)
Dept: ORTHOPEDIC SURGERY | Facility: CLINIC | Age: 70
End: 2025-06-09
Payer: COMMERCIAL

## 2025-06-09 ENCOUNTER — ANESTHESIA (OUTPATIENT)
Dept: OPERATING ROOM | Facility: HOSPITAL | Age: 70
End: 2025-06-09
Payer: COMMERCIAL

## 2025-06-09 RX ORDER — LABETALOL HYDROCHLORIDE 5 MG/ML
5 INJECTION, SOLUTION INTRAVENOUS ONCE AS NEEDED
OUTPATIENT
Start: 2025-06-09

## 2025-06-09 RX ORDER — ONDANSETRON HYDROCHLORIDE 2 MG/ML
4 INJECTION, SOLUTION INTRAVENOUS ONCE AS NEEDED
OUTPATIENT
Start: 2025-06-09

## 2025-06-09 RX ORDER — SODIUM CHLORIDE, SODIUM LACTATE, POTASSIUM CHLORIDE, CALCIUM CHLORIDE 600; 310; 30; 20 MG/100ML; MG/100ML; MG/100ML; MG/100ML
100 INJECTION, SOLUTION INTRAVENOUS CONTINUOUS
OUTPATIENT
Start: 2025-06-09 | End: 2025-06-10

## 2025-06-09 RX ORDER — LIDOCAINE HYDROCHLORIDE 10 MG/ML
0.1 INJECTION, SOLUTION EPIDURAL; INFILTRATION; INTRACAUDAL; PERINEURAL ONCE
OUTPATIENT
Start: 2025-06-09 | End: 2025-06-09

## 2025-06-09 RX ORDER — OXYCODONE HYDROCHLORIDE 5 MG/1
5 TABLET ORAL EVERY 4 HOURS PRN
Refills: 0 | OUTPATIENT
Start: 2025-06-09

## 2025-06-09 RX ORDER — DIPHENHYDRAMINE HYDROCHLORIDE 50 MG/ML
12.5 INJECTION, SOLUTION INTRAMUSCULAR; INTRAVENOUS ONCE AS NEEDED
OUTPATIENT
Start: 2025-06-09

## 2025-06-09 NOTE — TELEPHONE ENCOUNTER
Copied from CRM #[id number]. Topic: Transfer to Department for Scheduling  >> Jun 8, 2025 10:41 AM Liliana NASH wrote:  Patient is requesting call back to reschedule surgery for tomorrow with Dr. Negrete. She is having complications following a nerve block with Dr. Wiseman and is having difficulty walking (connected her to Dr Wiseman answering service). Patient can be reached on cell.  Thank you.

## 2025-06-09 NOTE — DISCHARGE INSTRUCTIONS
Shoulder and Elbow Service  Huey Negrete MD    Discharge Instructions after Reverse Ball and Socket Arthroplasty     Surgical Dressing: You have a bandage over your shoulder. DO NOT REMOVE THIS BANDAGE. It is waterproof. OK to take showers, but do not submerge in a bath. Do not scrub the dressing, allow water to run over, and pat it dry.     Activity: Remain in your sling at all times. This includes sleeping in your sling. You should come out of the sling a 4-5x times a day to work on elbow range of motion. OK to use wrist and fingers for light activities. Do not actively move your shoulder during this time. Active reaching and lifting away from the body are not permitted. You may use the operative arm for activities of daily living that do not require the operative arm to leave the side of the body. Such as: eating, drinking and bathing. Remain non-weight bearing with the operative arm until you are seen post operatively.     Pain: Pain medication has been prescribed for you. You will likely need the strong, narcotic pain medicine (usually oxycodone) for the first 72 hours. If pain is severe in the first few days, it is OK to take the oxycodone as 2 tablets every 4 hours as needed. Otherwise, take 1 tablet every 4-6 hours. You should take the extra-strength tylenol with the oxycodone, and after a few days you should be only taking the tylenol. Use cryotherapy machine or ice on the shoulder for the first 2 weeks following surgery.    Other medications: OK to resume all other home medications the day after surgery, unless you were told otherwise. Avoid taking anti-inflammatory pain medications (Advil, Motrin, Ibuprofen, Aleve, Naproxen or Naprosyn) for 2 weeks after surgery. You will also be given anti-nausea medicine (Zofran) and anti-constipation medicine (docusate) and can use these as needed.     Blood clot prevention: Aspirin has been prescribed to prevent blood clots. Take one aspirin (81 mg) tablet once  per day for 2 weeks after surgery, unless you have an aspirin sensitivity or allergy, asthma or are on blood thinners. If Coumadin, Plavix, Xarelto or other blood thinning medication is prescribed for blood clot prevention, take this medication as directed by your medical clearance physician.     Sleeping: After shoulder surgery, it is often uncomfortable to sleep on your back or side. Recommended sleeping positions are in a recliner or in bed with a ramp pillow or multiple pillows under your back.     Swelling: Swelling around the shoulder that travels to the elbow/hand/fingers is normal after shoulder surgery. Doing your elbow/wrist/finger exercises will help reduce this.     Please call the office at [phone] for any problems. Including the following:  - Excessive redness of the incision  - ANY drainage at or around incision  - Fever of more than 101.5 F    A postoperative follow up appointment will be made for you. Please call [phone] with questions. You should see Dr Negrete 10-14 days after your surgical procedure.

## 2025-06-10 ENCOUNTER — TELEPHONE (OUTPATIENT)
Dept: PAIN MEDICINE | Facility: CLINIC | Age: 70
End: 2025-06-10
Payer: COMMERCIAL

## 2025-06-10 NOTE — TELEPHONE ENCOUNTER
Patient was seen in the ER yesterday for severe sciatic pain. States this started after her facet injection on 5/20. She also states she called the answering service on Sunday also but got no return call. She wants to know if this could be related to the facet injection and what she should do?

## 2025-06-12 ENCOUNTER — TELEPHONE (OUTPATIENT)
Dept: PAIN MEDICINE | Facility: CLINIC | Age: 70
End: 2025-06-12
Payer: COMMERCIAL

## 2025-06-12 NOTE — TELEPHONE ENCOUNTER
The pt called in bc she was seen in the ED and wanted to now get set up for the next facet block. However the pt was diagnosed in the ED with a blood clot and RN expained to her that the blood clot needs to be resolved before proceeding with the next facet block bc of being on the blood thinners. She stated she understood and will call us once she her treatment for her blood clot was completed and she is medically cleared to be off the blood thinner.

## 2025-06-19 ENCOUNTER — TELEPHONE (OUTPATIENT)
Dept: PREADMISSION TESTING | Facility: HOSPITAL | Age: 70
End: 2025-06-19

## 2025-06-20 ENCOUNTER — HOSPITAL ENCOUNTER (OUTPATIENT)
Facility: HOSPITAL | Age: 70
Setting detail: OUTPATIENT SURGERY
End: 2025-06-20
Attending: STUDENT IN AN ORGANIZED HEALTH CARE EDUCATION/TRAINING PROGRAM | Admitting: STUDENT IN AN ORGANIZED HEALTH CARE EDUCATION/TRAINING PROGRAM
Payer: COMMERCIAL

## 2025-06-20 ENCOUNTER — PREP FOR PROCEDURE (OUTPATIENT)
Dept: ORTHOPEDICS | Facility: HOSPITAL | Age: 70
End: 2025-06-20
Payer: COMMERCIAL

## 2025-06-20 DIAGNOSIS — M75.121 COMPLETE TEAR OF RIGHT ROTATOR CUFF, UNSPECIFIED WHETHER TRAUMATIC: Primary | ICD-10-CM

## 2025-06-20 RX ORDER — TRANEXAMIC ACID 10 MG/ML
1000 INJECTION, SOLUTION INTRAVENOUS ONCE
OUTPATIENT
Start: 2025-06-20 | End: 2025-06-20

## 2025-06-20 RX ORDER — CEFAZOLIN SODIUM 2 G/100ML
2 INJECTION, SOLUTION INTRAVENOUS ONCE
OUTPATIENT
Start: 2025-06-20 | End: 2025-06-20

## 2025-06-25 ENCOUNTER — OFFICE VISIT (OUTPATIENT)
Dept: ORTHOPEDIC SURGERY | Facility: CLINIC | Age: 70
End: 2025-06-25
Payer: COMMERCIAL

## 2025-06-25 ENCOUNTER — APPOINTMENT (OUTPATIENT)
Dept: RADIOLOGY | Facility: CLINIC | Age: 70
End: 2025-06-25
Payer: COMMERCIAL

## 2025-06-25 ENCOUNTER — APPOINTMENT (OUTPATIENT)
Dept: ORTHOPEDIC SURGERY | Facility: CLINIC | Age: 70
End: 2025-06-25
Payer: COMMERCIAL

## 2025-06-25 DIAGNOSIS — M25.551 HIP PAIN, ACUTE, RIGHT: ICD-10-CM

## 2025-06-25 DIAGNOSIS — M47.816 LUMBAR SPONDYLOSIS: ICD-10-CM

## 2025-06-25 DIAGNOSIS — M54.16 LUMBAR RADICULOPATHY: Primary | ICD-10-CM

## 2025-06-25 PROCEDURE — 3051F HG A1C>EQUAL 7.0%<8.0%: CPT | Performed by: PHYSICIAN ASSISTANT

## 2025-06-25 PROCEDURE — 99212 OFFICE O/P EST SF 10 MIN: CPT | Performed by: PHYSICIAN ASSISTANT

## 2025-06-25 NOTE — PROGRESS NOTES
"HPI:  Nahomi Saavedra is a 70 y.o. female who presents to the spine clinic today for ED follow up (Referred by ED for radiculopathy follow up).    Patient with chronic back and radicular pain who follows with pain management, Dr. Wiseman.     Recently evaluated in the ED at the [facility] on 06/09/25. Per note review \"approximately 3 days of atraumatic right lower back pain. Patient states that she was getting things out of the car, when she felt sharp pain to the right lumbar aspect of her back, which has been radiating to her right hip and the anterior aspect of her right thigh\". She was treated with pain medication and symptoms have improved.    XR images of the Hip were performed in the ED - no images for review however report available in EPIC. She was noted to have pelvic phleboliths and patient was under the assumption the pelvic phleboliths were the reason for the referral today.     ROS:  Reviewed on EMR and patient intake sheet.    PMH/SH:  Reviewed on EMR and patient intake sheet.    Exam:  Physical Exam  Spine Musculoskeletal Exam    Well appearing, NAD  Pleasant & cooperative  BMI 40.77    Patient declined physical exam    Radiology:     XR  Hip dated 06/09/25 report from the [facility] reviewed:    RESULT: Mild hip osteoarthritis. Sacroiliac joint spaces are grossly   preserved. There is multilevel degenerative change seen within the   visualized lower lumbar spine. Phleboliths are seen within the pelvis     Patient declined further imaging today    Assessment and Plan:  1. Lumbar spondylosis  2. Lumbar radiculopathy (Primary)  3. Hip pain, acute, right    I explained to Nahomi that this is a spine clinic and the ED referral was for back pain and radiculopathy. Patient stated she follows with pain management and would prefer to continue her care with Dr. Wiseman. She declined further treatment or evaluation today.     Recommend follow up with PCP regarding pelvic " phleboliths.    At the end of the visit patient noticeably upset/frustrated due to confusion with the referral. I apologized for the confusion. Happy to see her for follow up for her spine issues in the future if needed.     Neetu Byrd PA-C  Department of Orthopaedic Surgery    [address] [place] [address]    Voicemail: [phone]   Appts: [phone]  Fax: [phone]

## 2025-07-08 ENCOUNTER — PATIENT MESSAGE (OUTPATIENT)
Dept: ENDOCRINOLOGY | Facility: CLINIC | Age: 70
End: 2025-07-08
Payer: COMMERCIAL

## 2025-07-08 DIAGNOSIS — Z79.4 TYPE 2 DIABETES MELLITUS WITH HYPERGLYCEMIA, WITH LONG-TERM CURRENT USE OF INSULIN: Primary | ICD-10-CM

## 2025-07-08 DIAGNOSIS — E11.65 TYPE 2 DIABETES MELLITUS WITH HYPERGLYCEMIA, WITH LONG-TERM CURRENT USE OF INSULIN: Primary | ICD-10-CM

## 2025-07-23 DIAGNOSIS — G47.30 SLEEP APNEA, UNSPECIFIED TYPE: ICD-10-CM

## 2025-07-23 DIAGNOSIS — E11.9 TYPE 2 DIABETES MELLITUS WITHOUT COMPLICATION, WITH LONG-TERM CURRENT USE OF INSULIN: Primary | ICD-10-CM

## 2025-07-23 DIAGNOSIS — Z79.4 TYPE 2 DIABETES MELLITUS WITHOUT COMPLICATION, WITH LONG-TERM CURRENT USE OF INSULIN: Primary | ICD-10-CM

## 2025-07-30 ENCOUNTER — TELEPHONE (OUTPATIENT)
Dept: PAIN MEDICINE | Facility: CLINIC | Age: 70
End: 2025-07-30
Payer: COMMERCIAL

## 2025-08-05 ENCOUNTER — TELEPHONE (OUTPATIENT)
Dept: PAIN MEDICINE | Facility: CLINIC | Age: 70
End: 2025-08-05
Payer: COMMERCIAL

## 2025-08-05 NOTE — TELEPHONE ENCOUNTER
Patient has returned call and left message she would like to schedule procedure and is asking if you could call back to schedule.  Thanks!

## 2025-08-15 ENCOUNTER — TELEPHONE (OUTPATIENT)
Dept: ENDOCRINOLOGY | Facility: CLINIC | Age: 70
End: 2025-08-15
Payer: COMMERCIAL

## 2025-08-18 ENCOUNTER — APPOINTMENT (OUTPATIENT)
Dept: ENDOCRINOLOGY | Facility: CLINIC | Age: 70
End: 2025-08-18
Payer: COMMERCIAL

## 2025-08-18 VITALS
TEMPERATURE: 98.6 F | RESPIRATION RATE: 20 BRPM | WEIGHT: 237.8 LBS | SYSTOLIC BLOOD PRESSURE: 147 MMHG | HEART RATE: 76 BPM | HEIGHT: 63 IN | BODY MASS INDEX: 42.13 KG/M2 | DIASTOLIC BLOOD PRESSURE: 75 MMHG

## 2025-08-18 DIAGNOSIS — E66.813 OBESITY, CLASS III, BMI 40-49.9 (MORBID OBESITY): ICD-10-CM

## 2025-08-18 DIAGNOSIS — E11.65 TYPE 2 DIABETES MELLITUS WITH HYPERGLYCEMIA, WITH LONG-TERM CURRENT USE OF INSULIN: Primary | ICD-10-CM

## 2025-08-18 DIAGNOSIS — Z79.4 TYPE 2 DIABETES MELLITUS WITH HYPERGLYCEMIA, WITH LONG-TERM CURRENT USE OF INSULIN: Primary | ICD-10-CM

## 2025-08-18 PROCEDURE — 99215 OFFICE O/P EST HI 40 MIN: CPT | Performed by: NURSE PRACTITIONER

## 2025-08-18 PROCEDURE — 3077F SYST BP >= 140 MM HG: CPT | Performed by: NURSE PRACTITIONER

## 2025-08-18 PROCEDURE — 3078F DIAST BP <80 MM HG: CPT | Performed by: NURSE PRACTITIONER

## 2025-08-18 PROCEDURE — 1160F RVW MEDS BY RX/DR IN RCRD: CPT | Performed by: NURSE PRACTITIONER

## 2025-08-18 PROCEDURE — G2211 COMPLEX E/M VISIT ADD ON: HCPCS | Performed by: NURSE PRACTITIONER

## 2025-08-18 PROCEDURE — 1159F MED LIST DOCD IN RCRD: CPT | Performed by: NURSE PRACTITIONER

## 2025-08-18 PROCEDURE — 3051F HG A1C>EQUAL 7.0%<8.0%: CPT | Performed by: NURSE PRACTITIONER

## 2025-08-18 PROCEDURE — 3008F BODY MASS INDEX DOCD: CPT | Performed by: NURSE PRACTITIONER

## 2025-08-18 ASSESSMENT — PATIENT HEALTH QUESTIONNAIRE - PHQ9
2. FEELING DOWN, DEPRESSED OR HOPELESS: NOT AT ALL
SUM OF ALL RESPONSES TO PHQ9 QUESTIONS 1 AND 2: 2
1. LITTLE INTEREST OR PLEASURE IN DOING THINGS: MORE THAN HALF THE DAYS

## 2025-08-22 ENCOUNTER — APPOINTMENT (OUTPATIENT)
Dept: CARDIOLOGY | Facility: CLINIC | Age: 70
End: 2025-08-22
Payer: COMMERCIAL

## 2026-02-19 ENCOUNTER — APPOINTMENT (OUTPATIENT)
Dept: ENDOCRINOLOGY | Facility: CLINIC | Age: 71
End: 2026-02-19
Payer: COMMERCIAL

## (undated) DEVICE — GLOVE, SURGICAL, PROTEXIS PI BLUE W/NEUTHERA, 7.5, PF, LF

## (undated) DEVICE — SUTURE, MONOCRYL, 3-0, 27 IN, PS-2, UNDYED

## (undated) DEVICE — RETRIEVER, SUTURE, HEWSON

## (undated) DEVICE — HIGH FLOW TIP FOR INTERPULSE HANDPIECE SET

## (undated) DEVICE — INTERPULSE HANDPIECE SET W/ 10FT SUCTION TUBING

## (undated) DEVICE — WOUND SYSTEM, DEBRIDEMENT & CLEANING, O.R DUOPAK

## (undated) DEVICE — BLADE, SAW PFC DUAL CUT 25 X 90

## (undated) DEVICE — IRRIGATION SYSTEM, WOUND, SURGIPHOR, 450ML, STERILE

## (undated) DEVICE — DRAPE, SHEET, THREE QUARTER, FAN FOLD, 57 X 77 IN

## (undated) DEVICE — SUTURE, CTD, VICRYL, 2-0, UND, BR, CT-2

## (undated) DEVICE — HOOD, SURGICAL, FLYTE SURGICOOL

## (undated) DEVICE — GLOVE, SURGICAL, PROTEXIS PI MICRO, 7.0, PF, LF

## (undated) DEVICE — SUTURE, ETHIBOND XTRA, 5 V-37, GRN/BR, LF

## (undated) DEVICE — MASK, FACE, TENET, FOAM POSITIONING, DISPOSABLE

## (undated) DEVICE — DRAPE, INCISE, ANTIMICROBIAL, IOBAN 2, LARGE, 17 X 23 IN, DISPOSABLE, STERILE

## (undated) DEVICE — SUTURE, VICRYL, 0, 27 IN, CT-1, UNDYED

## (undated) DEVICE — KIT, BEACH CHAIR TRIMANO

## (undated) DEVICE — DRESSING, MEPILEX BORDER, POST-OP AG, 4 X 10 IN

## (undated) DEVICE — PAD, GROUNDING, ELECTROSURGICAL, W/9 FT CABLE, POLYHESIVE II, ADULT, LF

## (undated) DEVICE — SUTURE, FIBERWIRE 2, T-5 TAPER NEEDLE, 38"

## (undated) DEVICE — DRAPE, INSTRUMENT, W/POUCH, STERI DRAPE, 7 X 11 IN, DISPOSABLE, STERILE

## (undated) DEVICE — ADHESIVE, SKIN, DERMABOND ADVANCED, 15CM, PEN-STYLE

## (undated) DEVICE — Device

## (undated) DEVICE — SUTURE, TAPE, 36 IN X 1.3 IN, TAPERED END/ NEEDLE

## (undated) DEVICE — SUTURE TAPE, 1.3MM 40IN, BLK/WH, W/TAPER NDL 36.6MM